# Patient Record
Sex: MALE | Race: BLACK OR AFRICAN AMERICAN | Employment: UNEMPLOYED | URBAN - METROPOLITAN AREA
[De-identification: names, ages, dates, MRNs, and addresses within clinical notes are randomized per-mention and may not be internally consistent; named-entity substitution may affect disease eponyms.]

---

## 2022-04-04 ENCOUNTER — HOSPITAL ENCOUNTER (OUTPATIENT)
Dept: PREADMISSION TESTING | Age: 57
Discharge: HOME | End: 2022-04-04
Payer: MEDICARE

## 2022-04-04 ENCOUNTER — TRANSCRIBE ORDER (OUTPATIENT)
Dept: REGISTRATION | Age: 57
End: 2022-04-04

## 2022-04-04 DIAGNOSIS — Z01.812 BLOOD TESTS PRIOR TO TREATMENT OR PROCEDURE: ICD-10-CM

## 2022-04-04 DIAGNOSIS — M17.12 DEGENERATIVE ARTHRITIS OF LEFT KNEE: Primary | ICD-10-CM

## 2022-04-04 DIAGNOSIS — M17.12 DEGENERATIVE ARTHRITIS OF LEFT KNEE: ICD-10-CM

## 2022-04-04 LAB
ALBUMIN SERPL-MCNC: 4.4 G/DL (ref 3.4–5)
ALBUMIN/GLOB SERPL: 1.3 {RATIO} (ref 0.8–1.7)
ALP SERPL-CCNC: 80 U/L (ref 45–117)
ALT SERPL-CCNC: 27 U/L (ref 16–61)
ANION GAP SERPL CALC-SCNC: 2 MMOL/L (ref 3–18)
APPEARANCE UR: CLEAR
APTT PPP: 30.1 SEC (ref 23–36.4)
AST SERPL-CCNC: 18 U/L (ref 10–38)
ATRIAL RATE: 77 BPM
BASOPHILS # BLD: 0.1 K/UL (ref 0–0.1)
BASOPHILS NFR BLD: 1 % (ref 0–2)
BILIRUB SERPL-MCNC: 0.7 MG/DL (ref 0.2–1)
BILIRUB UR QL: NEGATIVE
BUN SERPL-MCNC: 14 MG/DL (ref 7–18)
BUN/CREAT SERPL: 10 (ref 12–20)
CALCIUM SERPL-MCNC: 9.6 MG/DL (ref 8.5–10.1)
CALCULATED P AXIS, ECG09: 56 DEGREES
CALCULATED R AXIS, ECG10: 4 DEGREES
CALCULATED T AXIS, ECG11: 0 DEGREES
CHLORIDE SERPL-SCNC: 108 MMOL/L (ref 100–111)
CO2 SERPL-SCNC: 31 MMOL/L (ref 21–32)
COLOR UR: YELLOW
CREAT SERPL-MCNC: 1.35 MG/DL (ref 0.6–1.3)
DIAGNOSIS, 93000: NORMAL
DIFFERENTIAL METHOD BLD: ABNORMAL
EOSINOPHIL # BLD: 0.5 K/UL (ref 0–0.4)
EOSINOPHIL NFR BLD: 6 % (ref 0–5)
ERYTHROCYTE [DISTWIDTH] IN BLOOD BY AUTOMATED COUNT: 15 % (ref 11.6–14.5)
GLOBULIN SER CALC-MCNC: 3.4 G/DL (ref 2–4)
GLUCOSE SERPL-MCNC: 79 MG/DL (ref 74–99)
GLUCOSE UR STRIP.AUTO-MCNC: NEGATIVE MG/DL
HBA1C MFR BLD: 6.2 % (ref 4.2–5.6)
HCT VFR BLD AUTO: 45.9 % (ref 36–48)
HGB BLD-MCNC: 14.6 G/DL (ref 13–16)
HGB UR QL STRIP: NEGATIVE
IMM GRANULOCYTES # BLD AUTO: 0 K/UL (ref 0–0.04)
IMM GRANULOCYTES NFR BLD AUTO: 0 % (ref 0–0.5)
INR PPP: 1 (ref 0.8–1.2)
KETONES UR QL STRIP.AUTO: NEGATIVE MG/DL
LEUKOCYTE ESTERASE UR QL STRIP.AUTO: NEGATIVE
LYMPHOCYTES # BLD: 3.6 K/UL (ref 0.9–3.6)
LYMPHOCYTES NFR BLD: 46 % (ref 21–52)
MCH RBC QN AUTO: 25.7 PG (ref 24–34)
MCHC RBC AUTO-ENTMCNC: 31.8 G/DL (ref 31–37)
MCV RBC AUTO: 80.7 FL (ref 78–100)
MONOCYTES # BLD: 0.7 K/UL (ref 0.05–1.2)
MONOCYTES NFR BLD: 9 % (ref 3–10)
NEUTS SEG # BLD: 3 K/UL (ref 1.8–8)
NEUTS SEG NFR BLD: 37 % (ref 40–73)
NITRITE UR QL STRIP.AUTO: NEGATIVE
NRBC # BLD: 0 K/UL (ref 0–0.01)
NRBC BLD-RTO: 0 PER 100 WBC
P-R INTERVAL, ECG05: 186 MS
PH UR STRIP: 5 [PH] (ref 5–8)
PLATELET # BLD AUTO: 252 K/UL (ref 135–420)
PMV BLD AUTO: 10.1 FL (ref 9.2–11.8)
POTASSIUM SERPL-SCNC: 4.3 MMOL/L (ref 3.5–5.5)
PROT SERPL-MCNC: 7.8 G/DL (ref 6.4–8.2)
PROT UR STRIP-MCNC: NEGATIVE MG/DL
PROTHROMBIN TIME: 13 SEC (ref 11.5–15.2)
Q-T INTERVAL, ECG07: 384 MS
QRS DURATION, ECG06: 78 MS
QTC CALCULATION (BEZET), ECG08: 434 MS
RBC # BLD AUTO: 5.69 M/UL (ref 4.35–5.65)
SODIUM SERPL-SCNC: 141 MMOL/L (ref 136–145)
SP GR UR REFRACTOMETRY: 1.01 (ref 1–1.03)
UROBILINOGEN UR QL STRIP.AUTO: 0.2 EU/DL (ref 0.2–1)
VENTRICULAR RATE, ECG03: 77 BPM
WBC # BLD AUTO: 7.9 K/UL (ref 4.6–13.2)

## 2022-04-04 PROCEDURE — 85025 COMPLETE CBC W/AUTO DIFF WBC: CPT

## 2022-04-04 PROCEDURE — 83036 HEMOGLOBIN GLYCOSYLATED A1C: CPT

## 2022-04-04 PROCEDURE — 87086 URINE CULTURE/COLONY COUNT: CPT

## 2022-04-04 PROCEDURE — 85610 PROTHROMBIN TIME: CPT

## 2022-04-04 PROCEDURE — 36415 COLL VENOUS BLD VENIPUNCTURE: CPT

## 2022-04-04 PROCEDURE — 81003 URINALYSIS AUTO W/O SCOPE: CPT

## 2022-04-04 PROCEDURE — 93005 ELECTROCARDIOGRAM TRACING: CPT

## 2022-04-04 PROCEDURE — 85730 THROMBOPLASTIN TIME PARTIAL: CPT

## 2022-04-04 PROCEDURE — 80053 COMPREHEN METABOLIC PANEL: CPT

## 2022-04-06 ENCOUNTER — HOSPITAL ENCOUNTER (OUTPATIENT)
Dept: PREADMISSION TESTING | Age: 57
Discharge: HOME | End: 2022-04-06

## 2022-04-06 VITALS — BODY MASS INDEX: 40.02 KG/M2 | WEIGHT: 255 LBS | HEIGHT: 67 IN

## 2022-04-06 LAB
BACTERIA SPEC CULT: NORMAL
SERVICE CMNT-IMP: NORMAL

## 2022-04-06 RX ORDER — BICTEGRAVIR SODIUM, EMTRICITABINE, AND TENOFOVIR ALAFENAMIDE FUMARATE 50; 200; 25 MG/1; MG/1; MG/1
1 TABLET ORAL DAILY
COMMUNITY

## 2022-04-06 RX ORDER — INSULIN ASPART 100 [IU]/ML
30 INJECTION, SUSPENSION SUBCUTANEOUS 3 TIMES DAILY
COMMUNITY

## 2022-04-06 RX ORDER — ATORVASTATIN CALCIUM 10 MG/1
10 TABLET, FILM COATED ORAL DAILY
COMMUNITY

## 2022-04-06 RX ORDER — SODIUM CHLORIDE, SODIUM LACTATE, POTASSIUM CHLORIDE, CALCIUM CHLORIDE 600; 310; 30; 20 MG/100ML; MG/100ML; MG/100ML; MG/100ML
125 INJECTION, SOLUTION INTRAVENOUS CONTINUOUS
Status: CANCELLED | OUTPATIENT
Start: 2022-04-20

## 2022-04-06 RX ORDER — DARUNAVIR ETHANOLATE AND COBICISTAT 800; 150 MG/1; MG/1
1 TABLET, FILM COATED ORAL DAILY
COMMUNITY

## 2022-04-06 RX ORDER — DIAZEPAM 10 MG/1
10 TABLET ORAL
COMMUNITY

## 2022-04-06 RX ORDER — AMLODIPINE BESYLATE 5 MG/1
5 TABLET ORAL DAILY
COMMUNITY

## 2022-04-06 RX ORDER — ASENAPINE 5.7 MG/D
1 FILM, EXTENDED RELEASE TRANSDERMAL DAILY
COMMUNITY

## 2022-04-06 RX ORDER — CEFAZOLIN SODIUM/WATER 2 G/20 ML
2 SYRINGE (ML) INTRAVENOUS ONCE
Status: CANCELLED | OUTPATIENT
Start: 2022-04-20 | End: 2022-04-20

## 2022-04-06 NOTE — PERIOP NOTES
PAT - SURGICAL PRE-ADMISSION INSTRUCTIONS    NAME:  Mina Melgar                                                          TODAY'S DATE:  4/6/2022    SURGERY DATE:  4/20/2022                                  SURGERY ARRIVAL TIME:   tbd    1. Do NOT eat or drink anything, including candy or gum, after MIDNIGHT on 4-20-22, unless you have specific instructions from your Surgeon or Anesthesia Provider to do so. 2. No smoking 24 hours before surgery. 3. No alcohol 24 hours prior to the day of surgery. 4. No recreational drugs for one week prior to the day of surgery. 5. Leave all valuables, including money/purse, at home. 6. Remove all jewelry, nail polish, makeup (including mascara); no lotions, powders, deodorant, or perfume/cologne/after shave. 7. Glasses/Contact lenses and Dentures may be worn to the hospital.  They will be removed prior to surgery. 8. Call your doctor if symptoms of a cold or illness develop within 24 ours prior to surgery. 9. AN ADULT MUST DRIVE YOU HOME AFTER OUTPATIENT SURGERY. 10. If you are having an OUTPATIENT procedure, please make arrangements for a responsible adult to be with you for 24 hours after your surgery. 11. If you are admitted to the hospital, you will be assigned to a bed after surgery is complete. Normally a family member will not be able to see you until you are in your assigned bed. 12. Visitation Restrictions Explained. Pt has an advance directive not on file. Pt does not have a DNR order. Pt does not accept blood products. Special Instructions:  Covid Test Pt is vaccinated. Pt will bring vaccination record in [facility] [place] requirements discussed  Take these medications the morning of surgery with a sip of water:  amlodipine, atorvastatin, acetaminophen,   Do not take insulin in the morning on surgery day. Patient Prep:    use CHG solution, Instructions provided.      These surgical instructions were reviewed with patient during the PAT phone call. Pt verbalized understanding of instructions provided. Preoperative Nutrition Screen (VALARIE)   Patient's Age: 61 y.o. Patient's BMI: Estimated body mass index is 39.94 kg/m² as calculated from the following:    Height as of this encounter: 5' 7\" (1.702 m). Weight as of this encounter: 115.7 kg (255 lb). If the answer to any of the following is Yes, then recommend prescribe Oral Nutrition Supplements (ONS) for at least 7 days prior to surgery and/or order referral to dietitian for further assessment and nutrition therapy. 1. Does the patient have a documented serum albumin less than 3.0 within the last 90 days? No = 0          2. Is patient's BMI less than 18.5 (or less than 20 if age over 72)? No = 0        3. Has the patient had an unplanned weight loss of 10% of body weight or more in the last 6 months? No = 0   4. Has the patient been eating less than 50% of their normal diet in the preceding week?  No = 0   VALARIE Score (number of Yes responses), 0-4 0

## 2022-04-07 LAB
BACTERIA SPEC CULT: NORMAL
BACTERIA SPEC CULT: NORMAL
SERVICE CMNT-IMP: NORMAL

## 2022-04-15 NOTE — NURSE NAVIGATOR
Phil Danielle watched the pre op seminar online and received a preoperative education booklet in anticipation of surgery    Nurse Navigator

## 2022-04-20 ENCOUNTER — ANESTHESIA EVENT (OUTPATIENT)
Dept: SURGERY | Age: 57
DRG: 470 | End: 2022-04-20
Payer: MEDICARE

## 2022-04-20 ENCOUNTER — HOSPITAL ENCOUNTER (INPATIENT)
Age: 57
LOS: 1 days | Discharge: HOME HEALTH | DRG: 470 | End: 2022-04-21
Attending: ORTHOPAEDIC SURGERY | Admitting: ORTHOPAEDIC SURGERY
Payer: MEDICARE

## 2022-04-20 ENCOUNTER — ANESTHESIA (OUTPATIENT)
Dept: SURGERY | Age: 57
DRG: 470 | End: 2022-04-20
Payer: MEDICARE

## 2022-04-20 ENCOUNTER — APPOINTMENT (OUTPATIENT)
Dept: GENERAL RADIOLOGY | Age: 57
DRG: 470 | End: 2022-04-20
Attending: PHYSICIAN ASSISTANT
Payer: MEDICARE

## 2022-04-20 DIAGNOSIS — Z96.652 TOTAL KNEE REPLACEMENT STATUS, LEFT: Primary | ICD-10-CM

## 2022-04-20 LAB
GLUCOSE BLD STRIP.AUTO-MCNC: 108 MG/DL (ref 70–110)
GLUCOSE BLD STRIP.AUTO-MCNC: 139 MG/DL (ref 70–110)

## 2022-04-20 PROCEDURE — 2709999900 HC NON-CHARGEABLE SUPPLY: Performed by: ORTHOPAEDIC SURGERY

## 2022-04-20 PROCEDURE — C1713 ANCHOR/SCREW BN/BN,TIS/BN: HCPCS | Performed by: ORTHOPAEDIC SURGERY

## 2022-04-20 PROCEDURE — 0SRD069 REPLACEMENT OF LEFT KNEE JOINT WITH OXIDIZED ZIRCONIUM ON POLYETHYLENE SYNTHETIC SUBSTITUTE, CEMENTED, OPEN APPROACH: ICD-10-PCS | Performed by: ORTHOPAEDIC SURGERY

## 2022-04-20 PROCEDURE — 74011000250 HC RX REV CODE- 250: Performed by: NURSE ANESTHETIST, CERTIFIED REGISTERED

## 2022-04-20 PROCEDURE — C1776 JOINT DEVICE (IMPLANTABLE): HCPCS | Performed by: ORTHOPAEDIC SURGERY

## 2022-04-20 PROCEDURE — 77030013708 HC HNDPC SUC IRR PULS STRY –B: Performed by: ORTHOPAEDIC SURGERY

## 2022-04-20 PROCEDURE — 77030011628: Performed by: ORTHOPAEDIC SURGERY

## 2022-04-20 PROCEDURE — 77030040815: Performed by: ORTHOPAEDIC SURGERY

## 2022-04-20 PROCEDURE — 77030002933 HC SUT MCRYL J&J -A: Performed by: ORTHOPAEDIC SURGERY

## 2022-04-20 PROCEDURE — 77030040361 HC SLV COMPR DVT MDII -B: Performed by: ORTHOPAEDIC SURGERY

## 2022-04-20 PROCEDURE — 77030038010: Performed by: ORTHOPAEDIC SURGERY

## 2022-04-20 PROCEDURE — C9290 INJ, BUPIVACAINE LIPOSOME: HCPCS | Performed by: ORTHOPAEDIC SURGERY

## 2022-04-20 PROCEDURE — 76010000131 HC OR TIME 2 TO 2.5 HR: Performed by: ORTHOPAEDIC SURGERY

## 2022-04-20 PROCEDURE — 77030000032 HC CUF TRNQT ZIMM -B: Performed by: ORTHOPAEDIC SURGERY

## 2022-04-20 PROCEDURE — 77030033067 HC SUT PDO STRATFX SPIR J&J -B: Performed by: ORTHOPAEDIC SURGERY

## 2022-04-20 PROCEDURE — 64447 NJX AA&/STRD FEMORAL NRV IMG: CPT | Performed by: ORTHOPAEDIC SURGERY

## 2022-04-20 PROCEDURE — 77030014144 HC TY SPN ANES BBMI -B: Performed by: STUDENT IN AN ORGANIZED HEALTH CARE EDUCATION/TRAINING PROGRAM

## 2022-04-20 PROCEDURE — 74011250636 HC RX REV CODE- 250/636: Performed by: ORTHOPAEDIC SURGERY

## 2022-04-20 PROCEDURE — 76060000035 HC ANESTHESIA 2 TO 2.5 HR: Performed by: ORTHOPAEDIC SURGERY

## 2022-04-20 PROCEDURE — 74011000250 HC RX REV CODE- 250: Performed by: ORTHOPAEDIC SURGERY

## 2022-04-20 PROCEDURE — 74011250636 HC RX REV CODE- 250/636: Performed by: PHYSICIAN ASSISTANT

## 2022-04-20 PROCEDURE — 74011250637 HC RX REV CODE- 250/637: Performed by: PHYSICIAN ASSISTANT

## 2022-04-20 PROCEDURE — 74011250636 HC RX REV CODE- 250/636: Performed by: STUDENT IN AN ORGANIZED HEALTH CARE EDUCATION/TRAINING PROGRAM

## 2022-04-20 PROCEDURE — 77030018673: Performed by: ORTHOPAEDIC SURGERY

## 2022-04-20 PROCEDURE — 77030011265 HC ELECTRD BLD HEX COVD -A: Performed by: ORTHOPAEDIC SURGERY

## 2022-04-20 PROCEDURE — 77030034479 HC ADH SKN CLSR PRINEO J&J -B: Performed by: ORTHOPAEDIC SURGERY

## 2022-04-20 PROCEDURE — 65270000029 HC RM PRIVATE

## 2022-04-20 PROCEDURE — 77030020782 HC GWN BAIR PAWS FLX 3M -B: Performed by: ORTHOPAEDIC SURGERY

## 2022-04-20 PROCEDURE — 97530 THERAPEUTIC ACTIVITIES: CPT

## 2022-04-20 PROCEDURE — 77030027138 HC INCENT SPIROMETER -A: Performed by: ORTHOPAEDIC SURGERY

## 2022-04-20 PROCEDURE — 74011250636 HC RX REV CODE- 250/636: Performed by: NURSE ANESTHETIST, CERTIFIED REGISTERED

## 2022-04-20 PROCEDURE — 77030031139 HC SUT VCRL2 J&J -A: Performed by: ORTHOPAEDIC SURGERY

## 2022-04-20 PROCEDURE — 74011000258 HC RX REV CODE- 258: Performed by: ORTHOPAEDIC SURGERY

## 2022-04-20 PROCEDURE — 82962 GLUCOSE BLOOD TEST: CPT

## 2022-04-20 PROCEDURE — 76210000006 HC OR PH I REC 0.5 TO 1 HR: Performed by: ORTHOPAEDIC SURGERY

## 2022-04-20 PROCEDURE — 97162 PT EVAL MOD COMPLEX 30 MIN: CPT

## 2022-04-20 PROCEDURE — 73560 X-RAY EXAM OF KNEE 1 OR 2: CPT

## 2022-04-20 PROCEDURE — 77010033678 HC OXYGEN DAILY

## 2022-04-20 PROCEDURE — 97166 OT EVAL MOD COMPLEX 45 MIN: CPT

## 2022-04-20 PROCEDURE — 74011000250 HC RX REV CODE- 250: Performed by: STUDENT IN AN ORGANIZED HEALTH CARE EDUCATION/TRAINING PROGRAM

## 2022-04-20 PROCEDURE — 76942 ECHO GUIDE FOR BIOPSY: CPT | Performed by: ORTHOPAEDIC SURGERY

## 2022-04-20 PROCEDURE — 74011000250 HC RX REV CODE- 250: Performed by: PHYSICIAN ASSISTANT

## 2022-04-20 DEVICE — JOURNEY II BCS XLPE ARTICULAR                                    INSERT SIZE 5-6 LEFT 9MM
Type: IMPLANTABLE DEVICE | Site: KNEE | Status: FUNCTIONAL
Brand: JOURNEY

## 2022-04-20 DEVICE — KNEE K1 TOT HEMI STD CEM IMPL CAPPED K1 SN: Type: IMPLANTABLE DEVICE | Site: KNEE | Status: FUNCTIONAL

## 2022-04-20 DEVICE — IMPLANTABLE DEVICE: Type: IMPLANTABLE DEVICE | Site: KNEE | Status: FUNCTIONAL

## 2022-04-20 DEVICE — CEMENT BNE 40GM FULL DOSE PMMA W/O ANTIBIO HI VISC N RADPQ: Type: IMPLANTABLE DEVICE | Site: KNEE | Status: FUNCTIONAL

## 2022-04-20 DEVICE — GII XLPE OVAL RESURFACING PATELLA 35MM
Type: IMPLANTABLE DEVICE | Site: KNEE | Status: FUNCTIONAL
Brand: GENESIS II

## 2022-04-20 DEVICE — JOURNEY TIBIAL BASEPLATE NONPOROUS                                    LEFT SIZE 5
Type: IMPLANTABLE DEVICE | Site: KNEE | Status: FUNCTIONAL
Brand: JOURNEY

## 2022-04-20 DEVICE — JOURNEY II BCS FEMORAL OXINIUM                                    LEFT SIZE 5
Type: IMPLANTABLE DEVICE | Site: KNEE | Status: FUNCTIONAL
Brand: JOURNEY

## 2022-04-20 RX ORDER — SODIUM CHLORIDE, SODIUM LACTATE, POTASSIUM CHLORIDE, CALCIUM CHLORIDE 600; 310; 30; 20 MG/100ML; MG/100ML; MG/100ML; MG/100ML
100 INJECTION, SOLUTION INTRAVENOUS CONTINUOUS
Status: DISCONTINUED | OUTPATIENT
Start: 2022-04-20 | End: 2022-04-21 | Stop reason: HOSPADM

## 2022-04-20 RX ORDER — DEXAMETHASONE SODIUM PHOSPHATE 10 MG/ML
INJECTION INTRAMUSCULAR; INTRAVENOUS
Status: COMPLETED | OUTPATIENT
Start: 2022-04-20 | End: 2022-04-20

## 2022-04-20 RX ORDER — MIDAZOLAM HYDROCHLORIDE 1 MG/ML
INJECTION, SOLUTION INTRAMUSCULAR; INTRAVENOUS
Status: COMPLETED | OUTPATIENT
Start: 2022-04-20 | End: 2022-04-20

## 2022-04-20 RX ORDER — DIAZEPAM 5 MG/1
10 TABLET ORAL
Status: DISCONTINUED | OUTPATIENT
Start: 2022-04-20 | End: 2022-04-21 | Stop reason: HOSPADM

## 2022-04-20 RX ORDER — FENTANYL CITRATE 50 UG/ML
INJECTION, SOLUTION INTRAMUSCULAR; INTRAVENOUS
Status: COMPLETED
Start: 2022-04-20 | End: 2022-04-20

## 2022-04-20 RX ORDER — SODIUM CHLORIDE 0.9 % (FLUSH) 0.9 %
5-40 SYRINGE (ML) INJECTION EVERY 8 HOURS
Status: DISCONTINUED | OUTPATIENT
Start: 2022-04-20 | End: 2022-04-20 | Stop reason: HOSPADM

## 2022-04-20 RX ORDER — DIPHENHYDRAMINE HCL 25 MG
25 CAPSULE ORAL
Status: DISCONTINUED | OUTPATIENT
Start: 2022-04-20 | End: 2022-04-21 | Stop reason: HOSPADM

## 2022-04-20 RX ORDER — FENTANYL CITRATE 50 UG/ML
50 INJECTION, SOLUTION INTRAMUSCULAR; INTRAVENOUS
Status: DISCONTINUED | OUTPATIENT
Start: 2022-04-20 | End: 2022-04-20 | Stop reason: HOSPADM

## 2022-04-20 RX ORDER — INSULIN LISPRO 100 [IU]/ML
INJECTION, SOLUTION INTRAVENOUS; SUBCUTANEOUS
Status: DISCONTINUED | OUTPATIENT
Start: 2022-04-20 | End: 2022-04-21 | Stop reason: HOSPADM

## 2022-04-20 RX ORDER — DEXMEDETOMIDINE HYDROCHLORIDE 100 UG/ML
INJECTION, SOLUTION INTRAVENOUS
Status: COMPLETED | OUTPATIENT
Start: 2022-04-20 | End: 2022-04-20

## 2022-04-20 RX ORDER — NALBUPHINE HYDROCHLORIDE 10 MG/ML
5 INJECTION, SOLUTION INTRAMUSCULAR; INTRAVENOUS; SUBCUTANEOUS
Status: DISCONTINUED | OUTPATIENT
Start: 2022-04-20 | End: 2022-04-20 | Stop reason: HOSPADM

## 2022-04-20 RX ORDER — LIDOCAINE HYDROCHLORIDE 20 MG/ML
INJECTION, SOLUTION EPIDURAL; INFILTRATION; INTRACAUDAL; PERINEURAL AS NEEDED
Status: DISCONTINUED | OUTPATIENT
Start: 2022-04-20 | End: 2022-04-20 | Stop reason: HOSPADM

## 2022-04-20 RX ORDER — MIDAZOLAM HYDROCHLORIDE 1 MG/ML
INJECTION, SOLUTION INTRAMUSCULAR; INTRAVENOUS
Status: COMPLETED
Start: 2022-04-20 | End: 2022-04-20

## 2022-04-20 RX ORDER — OXYCODONE HYDROCHLORIDE 5 MG/1
5 TABLET ORAL
Status: DISCONTINUED | OUTPATIENT
Start: 2022-04-20 | End: 2022-04-21 | Stop reason: HOSPADM

## 2022-04-20 RX ORDER — HYDROMORPHONE HYDROCHLORIDE 1 MG/ML
0.5 INJECTION, SOLUTION INTRAMUSCULAR; INTRAVENOUS; SUBCUTANEOUS AS NEEDED
Status: DISCONTINUED | OUTPATIENT
Start: 2022-04-20 | End: 2022-04-20 | Stop reason: HOSPADM

## 2022-04-20 RX ORDER — BUPIVACAINE HYDROCHLORIDE 7.5 MG/ML
INJECTION, SOLUTION INTRASPINAL
Status: COMPLETED | OUTPATIENT
Start: 2022-04-20 | End: 2022-04-20

## 2022-04-20 RX ORDER — HYDROMORPHONE HYDROCHLORIDE 1 MG/ML
1 INJECTION, SOLUTION INTRAMUSCULAR; INTRAVENOUS; SUBCUTANEOUS
Status: DISCONTINUED | OUTPATIENT
Start: 2022-04-20 | End: 2022-04-21 | Stop reason: HOSPADM

## 2022-04-20 RX ORDER — ONDANSETRON 2 MG/ML
4 INJECTION INTRAMUSCULAR; INTRAVENOUS ONCE
Status: DISCONTINUED | OUTPATIENT
Start: 2022-04-20 | End: 2022-04-20 | Stop reason: HOSPADM

## 2022-04-20 RX ORDER — DEXAMETHASONE SODIUM PHOSPHATE 10 MG/ML
INJECTION INTRAMUSCULAR; INTRAVENOUS
Status: COMPLETED
Start: 2022-04-20 | End: 2022-04-20

## 2022-04-20 RX ORDER — DEXTROSE MONOHYDRATE 100 MG/ML
0-250 INJECTION, SOLUTION INTRAVENOUS AS NEEDED
Status: DISCONTINUED | OUTPATIENT
Start: 2022-04-20 | End: 2022-04-21 | Stop reason: HOSPADM

## 2022-04-20 RX ORDER — SODIUM CHLORIDE 0.9 % (FLUSH) 0.9 %
5-40 SYRINGE (ML) INJECTION EVERY 8 HOURS
Status: CANCELLED | OUTPATIENT
Start: 2022-04-20

## 2022-04-20 RX ORDER — UREA 10 %
1 LOTION (ML) TOPICAL
Status: DISCONTINUED | OUTPATIENT
Start: 2022-04-21 | End: 2022-04-21 | Stop reason: HOSPADM

## 2022-04-20 RX ORDER — IBUPROFEN 200 MG
4 TABLET ORAL AS NEEDED
Status: DISCONTINUED | OUTPATIENT
Start: 2022-04-20 | End: 2022-04-21 | Stop reason: HOSPADM

## 2022-04-20 RX ORDER — SODIUM CHLORIDE, SODIUM LACTATE, POTASSIUM CHLORIDE, CALCIUM CHLORIDE 600; 310; 30; 20 MG/100ML; MG/100ML; MG/100ML; MG/100ML
125 INJECTION, SOLUTION INTRAVENOUS CONTINUOUS
Status: DISCONTINUED | OUTPATIENT
Start: 2022-04-20 | End: 2022-04-20

## 2022-04-20 RX ORDER — SODIUM CHLORIDE 0.9 % (FLUSH) 0.9 %
5-40 SYRINGE (ML) INJECTION AS NEEDED
Status: DISCONTINUED | OUTPATIENT
Start: 2022-04-20 | End: 2022-04-20 | Stop reason: HOSPADM

## 2022-04-20 RX ORDER — ACETAMINOPHEN 500 MG
1000 TABLET ORAL EVERY 8 HOURS
Status: DISCONTINUED | OUTPATIENT
Start: 2022-04-20 | End: 2022-04-21 | Stop reason: HOSPADM

## 2022-04-20 RX ORDER — NALOXONE HYDROCHLORIDE 0.4 MG/ML
0.4 INJECTION, SOLUTION INTRAMUSCULAR; INTRAVENOUS; SUBCUTANEOUS AS NEEDED
Status: DISCONTINUED | OUTPATIENT
Start: 2022-04-20 | End: 2022-04-21 | Stop reason: HOSPADM

## 2022-04-20 RX ORDER — SODIUM CHLORIDE 0.9 % (FLUSH) 0.9 %
5-40 SYRINGE (ML) INJECTION EVERY 8 HOURS
Status: DISCONTINUED | OUTPATIENT
Start: 2022-04-20 | End: 2022-04-21 | Stop reason: HOSPADM

## 2022-04-20 RX ORDER — FENTANYL CITRATE 50 UG/ML
INJECTION, SOLUTION INTRAMUSCULAR; INTRAVENOUS
Status: COMPLETED | OUTPATIENT
Start: 2022-04-20 | End: 2022-04-20

## 2022-04-20 RX ORDER — KETOROLAC TROMETHAMINE 30 MG/ML
15 INJECTION, SOLUTION INTRAMUSCULAR; INTRAVENOUS EVERY 6 HOURS
Status: DISCONTINUED | OUTPATIENT
Start: 2022-04-20 | End: 2022-04-21 | Stop reason: HOSPADM

## 2022-04-20 RX ORDER — NALOXONE HYDROCHLORIDE 0.4 MG/ML
0.04 INJECTION, SOLUTION INTRAMUSCULAR; INTRAVENOUS; SUBCUTANEOUS
Status: DISCONTINUED | OUTPATIENT
Start: 2022-04-20 | End: 2022-04-20 | Stop reason: HOSPADM

## 2022-04-20 RX ORDER — PROPOFOL 10 MG/ML
INJECTION, EMULSION INTRAVENOUS
Status: DISCONTINUED | OUTPATIENT
Start: 2022-04-20 | End: 2022-04-20 | Stop reason: HOSPADM

## 2022-04-20 RX ORDER — AMOXICILLIN 250 MG
1 CAPSULE ORAL 2 TIMES DAILY
Status: DISCONTINUED | OUTPATIENT
Start: 2022-04-20 | End: 2022-04-21 | Stop reason: HOSPADM

## 2022-04-20 RX ORDER — ONDANSETRON 2 MG/ML
4 INJECTION INTRAMUSCULAR; INTRAVENOUS
Status: DISCONTINUED | OUTPATIENT
Start: 2022-04-20 | End: 2022-04-21 | Stop reason: HOSPADM

## 2022-04-20 RX ORDER — OXYCODONE HYDROCHLORIDE 5 MG/1
10 TABLET ORAL
Status: DISCONTINUED | OUTPATIENT
Start: 2022-04-20 | End: 2022-04-21 | Stop reason: HOSPADM

## 2022-04-20 RX ORDER — TRANEXAMIC ACID 10 MG/ML
1 INJECTION, SOLUTION INTRAVENOUS
Status: CANCELLED | OUTPATIENT
Start: 2022-04-20 | End: 2022-04-20

## 2022-04-20 RX ORDER — ASPIRIN 81 MG/1
81 TABLET ORAL 2 TIMES DAILY
Status: DISCONTINUED | OUTPATIENT
Start: 2022-04-20 | End: 2022-04-21 | Stop reason: HOSPADM

## 2022-04-20 RX ORDER — ROPIVACAINE HYDROCHLORIDE 5 MG/ML
INJECTION, SOLUTION EPIDURAL; INFILTRATION; PERINEURAL
Status: COMPLETED | OUTPATIENT
Start: 2022-04-20 | End: 2022-04-20

## 2022-04-20 RX ORDER — BUPIVACAINE HYDROCHLORIDE 2.5 MG/ML
INJECTION, SOLUTION EPIDURAL; INFILTRATION; INTRACAUDAL AS NEEDED
Status: DISCONTINUED | OUTPATIENT
Start: 2022-04-20 | End: 2022-04-20 | Stop reason: HOSPADM

## 2022-04-20 RX ORDER — ALBUTEROL SULFATE 0.83 MG/ML
2.5 SOLUTION RESPIRATORY (INHALATION)
Status: DISCONTINUED | OUTPATIENT
Start: 2022-04-20 | End: 2022-04-20 | Stop reason: HOSPADM

## 2022-04-20 RX ORDER — EPHEDRINE SULFATE/0.9% NACL/PF 50 MG/5 ML
SYRINGE (ML) INTRAVENOUS AS NEEDED
Status: DISCONTINUED | OUTPATIENT
Start: 2022-04-20 | End: 2022-04-20 | Stop reason: HOSPADM

## 2022-04-20 RX ORDER — POVIDONE-IODINE 5 %
SOLUTION, NON-ORAL OPHTHALMIC (EYE) AS NEEDED
Status: DISCONTINUED | OUTPATIENT
Start: 2022-04-20 | End: 2022-04-20 | Stop reason: HOSPADM

## 2022-04-20 RX ORDER — CEFAZOLIN SODIUM/WATER 2 G/20 ML
2 SYRINGE (ML) INTRAVENOUS EVERY 8 HOURS
Status: COMPLETED | OUTPATIENT
Start: 2022-04-20 | End: 2022-04-21

## 2022-04-20 RX ORDER — SODIUM CHLORIDE, SODIUM LACTATE, POTASSIUM CHLORIDE, CALCIUM CHLORIDE 600; 310; 30; 20 MG/100ML; MG/100ML; MG/100ML; MG/100ML
50 INJECTION, SOLUTION INTRAVENOUS CONTINUOUS
Status: DISCONTINUED | OUTPATIENT
Start: 2022-04-20 | End: 2022-04-20 | Stop reason: HOSPADM

## 2022-04-20 RX ORDER — SODIUM CHLORIDE 0.9 % (FLUSH) 0.9 %
5-40 SYRINGE (ML) INJECTION AS NEEDED
Status: CANCELLED | OUTPATIENT
Start: 2022-04-20

## 2022-04-20 RX ORDER — DIPHENHYDRAMINE HYDROCHLORIDE 50 MG/ML
12.5 INJECTION, SOLUTION INTRAMUSCULAR; INTRAVENOUS
Status: DISCONTINUED | OUTPATIENT
Start: 2022-04-20 | End: 2022-04-20 | Stop reason: HOSPADM

## 2022-04-20 RX ORDER — TRANEXAMIC ACID 10 MG/ML
INJECTION, SOLUTION INTRAVENOUS AS NEEDED
Status: DISCONTINUED | OUTPATIENT
Start: 2022-04-20 | End: 2022-04-20 | Stop reason: HOSPADM

## 2022-04-20 RX ORDER — AMLODIPINE BESYLATE 5 MG/1
5 TABLET ORAL DAILY
Status: DISCONTINUED | OUTPATIENT
Start: 2022-04-21 | End: 2022-04-21 | Stop reason: HOSPADM

## 2022-04-20 RX ORDER — CEFAZOLIN SODIUM/WATER 2 G/20 ML
2 SYRINGE (ML) INTRAVENOUS ONCE
Status: COMPLETED | OUTPATIENT
Start: 2022-04-20 | End: 2022-04-20

## 2022-04-20 RX ORDER — KETAMINE HYDROCHLORIDE 10 MG/ML
INJECTION, SOLUTION INTRAMUSCULAR; INTRAVENOUS AS NEEDED
Status: DISCONTINUED | OUTPATIENT
Start: 2022-04-20 | End: 2022-04-20 | Stop reason: HOSPADM

## 2022-04-20 RX ORDER — ATORVASTATIN CALCIUM 10 MG/1
10 TABLET, FILM COATED ORAL DAILY
Status: DISCONTINUED | OUTPATIENT
Start: 2022-04-21 | End: 2022-04-21 | Stop reason: HOSPADM

## 2022-04-20 RX ORDER — SODIUM CHLORIDE 0.9 % (FLUSH) 0.9 %
5-40 SYRINGE (ML) INJECTION AS NEEDED
Status: DISCONTINUED | OUTPATIENT
Start: 2022-04-20 | End: 2022-04-21 | Stop reason: HOSPADM

## 2022-04-20 RX ADMIN — ACETAMINOPHEN 1000 MG: 500 TABLET ORAL at 18:37

## 2022-04-20 RX ADMIN — ROPIVACAINE HYDROCHLORIDE 20 ML: 5 INJECTION, SOLUTION EPIDURAL; INFILTRATION; PERINEURAL at 12:16

## 2022-04-20 RX ADMIN — MIDAZOLAM 2 MG: 1 INJECTION INTRAMUSCULAR; INTRAVENOUS at 13:34

## 2022-04-20 RX ADMIN — Medication 2 G: at 13:48

## 2022-04-20 RX ADMIN — SODIUM CHLORIDE, SODIUM LACTATE, POTASSIUM CHLORIDE, AND CALCIUM CHLORIDE 100 ML/HR: 600; 310; 30; 20 INJECTION, SOLUTION INTRAVENOUS at 17:31

## 2022-04-20 RX ADMIN — TRANEXAMIC ACID 1 G: 10 INJECTION, SOLUTION INTRAVENOUS at 13:44

## 2022-04-20 RX ADMIN — HYDROMORPHONE HYDROCHLORIDE 0.5 MG: 1 INJECTION, SOLUTION INTRAMUSCULAR; INTRAVENOUS; SUBCUTANEOUS at 16:33

## 2022-04-20 RX ADMIN — Medication 1 TABLET: at 20:16

## 2022-04-20 RX ADMIN — HYDROMORPHONE HYDROCHLORIDE 1 MG: 1 INJECTION, SOLUTION INTRAMUSCULAR; INTRAVENOUS; SUBCUTANEOUS at 23:13

## 2022-04-20 RX ADMIN — OXYCODONE HYDROCHLORIDE 10 MG: 5 TABLET ORAL at 21:31

## 2022-04-20 RX ADMIN — KETOROLAC TROMETHAMINE 15 MG: 30 INJECTION, SOLUTION INTRAMUSCULAR at 17:30

## 2022-04-20 RX ADMIN — KETOROLAC TROMETHAMINE 15 MG: 30 INJECTION, SOLUTION INTRAMUSCULAR at 23:05

## 2022-04-20 RX ADMIN — FENTANYL CITRATE 50 MCG: 50 INJECTION, SOLUTION INTRAMUSCULAR; INTRAVENOUS at 16:15

## 2022-04-20 RX ADMIN — CEFAZOLIN 2 G: 10 INJECTION, POWDER, FOR SOLUTION INTRAVENOUS at 21:32

## 2022-04-20 RX ADMIN — HYDROMORPHONE HYDROCHLORIDE 0.5 MG: 1 INJECTION, SOLUTION INTRAMUSCULAR; INTRAVENOUS; SUBCUTANEOUS at 16:43

## 2022-04-20 RX ADMIN — DEXAMETHASONE SODIUM PHOSPHATE 4 MG: 10 INJECTION, SOLUTION INTRAMUSCULAR; INTRAVENOUS at 12:16

## 2022-04-20 RX ADMIN — Medication 5 MG: at 14:29

## 2022-04-20 RX ADMIN — KETAMINE HYDROCHLORIDE 10 MG: 10 INJECTION, SOLUTION INTRAMUSCULAR; INTRAVENOUS at 14:46

## 2022-04-20 RX ADMIN — OXYCODONE HYDROCHLORIDE 10 MG: 5 TABLET ORAL at 17:29

## 2022-04-20 RX ADMIN — MIDAZOLAM 2 MG: 1 INJECTION INTRAMUSCULAR; INTRAVENOUS at 12:16

## 2022-04-20 RX ADMIN — BUPIVACAINE HYDROCHLORIDE IN DEXTROSE 12 MG: 7.5 INJECTION, SOLUTION SUBARACHNOID at 13:45

## 2022-04-20 RX ADMIN — KETAMINE HYDROCHLORIDE 10 MG: 10 INJECTION, SOLUTION INTRAMUSCULAR; INTRAVENOUS at 14:31

## 2022-04-20 RX ADMIN — FENTANYL CITRATE 50 MCG: 50 INJECTION, SOLUTION INTRAMUSCULAR; INTRAVENOUS at 16:27

## 2022-04-20 RX ADMIN — SODIUM CHLORIDE, PRESERVATIVE FREE 10 ML: 5 INJECTION INTRAVENOUS at 20:17

## 2022-04-20 RX ADMIN — ASPIRIN 81 MG: 81 TABLET, COATED ORAL at 20:16

## 2022-04-20 RX ADMIN — KETAMINE HYDROCHLORIDE 20 MG: 10 INJECTION, SOLUTION INTRAMUSCULAR; INTRAVENOUS at 13:58

## 2022-04-20 RX ADMIN — TRANEXAMIC ACID 1 G: 10 INJECTION, SOLUTION INTRAVENOUS at 15:17

## 2022-04-20 RX ADMIN — SODIUM CHLORIDE, SODIUM LACTATE, POTASSIUM CHLORIDE, AND CALCIUM CHLORIDE 125 ML/HR: 600; 310; 30; 20 INJECTION, SOLUTION INTRAVENOUS at 11:13

## 2022-04-20 RX ADMIN — SODIUM CHLORIDE, PRESERVATIVE FREE 10 ML: 5 INJECTION INTRAVENOUS at 23:05

## 2022-04-20 RX ADMIN — PROPOFOL 100 MCG/KG/MIN: 10 INJECTION, EMULSION INTRAVENOUS at 13:49

## 2022-04-20 RX ADMIN — KETAMINE HYDROCHLORIDE 10 MG: 10 INJECTION, SOLUTION INTRAMUSCULAR; INTRAVENOUS at 14:16

## 2022-04-20 RX ADMIN — FENTANYL CITRATE 100 MCG: 50 INJECTION, SOLUTION INTRAMUSCULAR; INTRAVENOUS at 12:16

## 2022-04-20 RX ADMIN — DEXMEDETOMIDINE HYDROCHLORIDE 25 MCG: 100 INJECTION, SOLUTION INTRAVENOUS at 12:16

## 2022-04-20 RX ADMIN — LIDOCAINE HYDROCHLORIDE 100 MG: 20 INJECTION, SOLUTION INTRAVENOUS at 13:49

## 2022-04-20 NOTE — PROGRESS NOTES
31 [facility] at this time. Patient alert and oriented x4. Denies SOB, chest pain. Shows no signs of distress. Patient lungs clear diminished bilaterally. Cap refill < 3 sec to all extremities. Patient has 20 G IV to R [facility] CDI. Stated pain 8/10. Dressing to L knee is CDI. Plexis bilat and alex stockings applied to RLE. Incentive spirometer at bedside. Patient reaches 2000 on IS. Bowel sounds present. Skin intact. Patient call light and possessions within reach. Bed locked and in lowest position. Nurse will continue to monitor. [address] to [employer]. Just completed suicide screening and patient is showing to be high risk. Patient reports suicidal idealization over the last couple of weeks. States his psychiatrist stopped his meds d/t risk of interaction with pain meds after surgery. Patient states he has no intentions at this time for carrying out suicide, but that it could change in the future. Per [employer], no sitter needed at this time. Place psych consult. 14 Taryn Meza to Dr. Gabriel Santana and notified of consult. States he will be here tomorrow to see patient. 315 [facility] [place]. States Dr. Aline Becker is coming to speak with patient. Jolie 36 to Dr. Aline Becker. Stated patient denies current thoughts and no sitter is needed. Patient to f/u with primary psych at discharge and continue with consult tomorrow. 1925  Bedside and verbal shift change report given to Sushma ARAUJO RN by Morena Coffman RN. Report included SBAR, kardex, MAR, and recent results. Patient has not yet voided post op or ambulated. Pain controlled with prn elias.

## 2022-04-20 NOTE — PERIOP NOTES
TRANSFER - OUT REPORT:    Verbal report given to Rosario TORRES RN(name) on Miles Winter  being transferred to [address] [place] [address](unit) for routine post - op       Report consisted of patients Situation, Background, Assessment and   Recommendations(SBAR). Information from the following report(s) SBAR, Kardex, OR Summary, Procedure Summary, Intake/Output, MAR, Recent Results, Med Rec Status and Cardiac Rhythm NSR was reviewed with the receiving nurse. Lines:   Peripheral IV 04/20/22 Right Antecubital (Active)   Site Assessment Clean;Clean, dry, & intact 04/20/22 1620   Phlebitis Assessment 0 04/20/22 1620   Infiltration Assessment 0 04/20/22 1620   Dressing Status Clean, dry, & intact 04/20/22 1620   Dressing Type Tape;Transparent 04/20/22 1620   Hub Color/Line Status Infusing 04/20/22 1620        Opportunity for questions and clarification was provided.       Patient transported with:   O2 @ 2 liters  Registered Nurse

## 2022-04-20 NOTE — PROGRESS NOTES
Pt reports to me that he feels he is at his baseline with respect to his psychiatric condition. Denies active suicidal ideation. Reports having call with his psychiatrist yesterday who is active in his care (Dr. Piter Moulton). Plan for Psych consult followed by close outpatient Psych follow-up and ongoing assessment and monitoring.

## 2022-04-20 NOTE — ANESTHESIA PREPROCEDURE EVALUATION
Relevant Problems   No relevant active problems       Anesthetic History   No history of anesthetic complications     Pertinent negatives: No PONV       Review of Systems / Medical History  Patient summary reviewed, nursing notes reviewed and pertinent labs reviewed    Pulmonary  Within defined limits            Pertinent negatives: No COPD, asthma and sleep apnea     Neuro/Psych         Psychiatric history (Bipolar disorder)  Pertinent negatives: No seizures and CVA   Cardiovascular    Hypertension            Pertinent negatives: No past MI and CHF       GI/Hepatic/Renal             Pertinent negatives: No liver disease and renal disease   Endo/Other    Diabetes: well controlled, type 2, using insulin    Arthritis     Other Findings   Comments: HIV positive           Physical Exam    Airway  Mallampati: I  TM Distance: 4 - 6 cm  Neck ROM: normal range of motion   Mouth opening: Normal     Cardiovascular    Rhythm: regular  Rate: normal         Dental    Dentition: Poor dentition     Pulmonary  Breath sounds clear to auscultation               Abdominal  GI exam deferred       Other Findings            Anesthetic Plan    ASA: 3  Anesthesia type: spinal      Post-op pain plan if not by surgeon: peripheral nerve block single    Induction: Intravenous  Anesthetic plan and risks discussed with: Patient

## 2022-04-20 NOTE — ANESTHESIA PROCEDURE NOTES
Peripheral Block    Start time: 4/20/2022 12:12 PM  End time: 4/20/2022 12:16 PM  Performed by: Joe Kyle MD  Authorized by: Joe Kyle MD       Pre-procedure: Indications: at surgeon's request and post-op pain management    Preanesthetic Checklist: patient identified, risks and benefits discussed, site marked, timeout performed, anesthesia consent given and patient being monitored    Timeout Time: 12:12 EDT          Block Type:   Block Type: Adductor canal block  Laterality:  Left  Monitoring:  Standard ASA monitoring, responsive to questions, oxygen, continuous pulse ox, heart rate and frequent vital sign checks  Injection Technique:  Single shot  Procedures: ultrasound guided    Patient Position: supine  Prep: chlorhexidine    Location:  Mid thigh  Needle Type:  Stimuplex  Needle Gauge:  20 G  Needle Localization:  Ultrasound guidance  Medication Injected:  FentaNYL citrate (PF) injection sedation initial, 100 mcg  midazolam (VERSED) injection, 2 mg  ropivacaine (PF) (NAROPIN) 5 mg/mL (0.5 %) injection, 20 mL  dexamethasone (PF) (DECADRON) 10 mg/mL injection, 4 mg  dexmedeTOMidine (PRECEDEX) 100 mcg/mL iv solution, 25 mcg  Med Admin Time: 4/20/2022 12:16 PM    Assessment:  Number of attempts:  1  Injection Assessment:  Incremental injection every 5 mL, negative aspiration for CSF, no paresthesia, ultrasound image on chart, local visualized surrounding nerve on ultrasound, negative aspiration for blood and no intravascular symptoms  Patient tolerance:  Patient tolerated the procedure well with no immediate complications  Ultrasound guidance was used to view and verify medication disbursement and was also used for needle placement.

## 2022-04-20 NOTE — PERIOP NOTES
18 [facility] made aware that SBAR is ready for review. Patient assigned room #225.  Rosario will be the nurse

## 2022-04-20 NOTE — ANESTHESIA PROCEDURE NOTES
Spinal Block    Start time: 4/20/2022 1:41 PM  End time: 4/20/2022 1:45 PM  Performed by: Collins De Oliveira MD  Authorized by: Collins De Oliveira MD     Pre-procedure: Indications: primary anesthetic  Preanesthetic Checklist: patient identified, risks and benefits discussed, anesthesia consent, site marked, patient being monitored and timeout performed    Timeout Time: 13:41 EDT          Spinal Block:   Patient Position:  Seated  Prep Region:  Lumbar  Prep: chlorhexidine      Location:  L3-4  Technique:  Single shot    Local Dose (mL):  1.6    Needle:   Needle Type:   Alva  Needle Gauge:  25 G  Attempts:  1      Events: CSF confirmed, no blood with aspiration and no paresthesia        Assessment:  Insertion:  Uncomplicated  Patient tolerance:  Patient tolerated the procedure well with no immediate complications

## 2022-04-20 NOTE — INTERVAL H&P NOTE
Update History & Physical    The Patient's History and Physical was reviewed with the patient and I examined the patient. There was no change. The surgical site was confirmed by the patient and me. Plan:  The risk, benefits, expected outcome, and alternative to the recommended procedure have been discussed with the patient. Patient understands and wants to proceed with the procedure.     Electronically signed by Monika Tse MD on 4/20/2022 at 10:48 AM

## 2022-04-20 NOTE — OP NOTES
[address]  [address] [place] [address], [phone]    LEFT TOTAL KNEE ARTHROPLASTY    Patient: Tian Stanton MRN: [id number]  SSN: [id number]    YOB: 1965  Age: 61 y.o. Sex: male      Date of Surgery: 4/20/2022   Preoperative Diagnosis: OSTEOARTHRITIS LEFT KNEE   Postoperative Diagnosis: OSTEOARTHRITIS LEFT KNEE   Location: [facility]  Surgeon: Cj Burnette MD  Physician Assistant: NIKKI Doran was medically necessary for holding of retractors for exposure and to complete the case. Assistant: Circ-1: Bernardino Quintero RN  Physician Assistant: Susana Meehan PA-C  Scrub Tech-1: Traci Rodriguez  Scrub Tech-Relief: Rajani Monzon  Scrub RN-1: Jareth Mcnally RN  Surg Asst-1: Nicola Gomez  Surg Asst-Relief: Hina Scott    Anesthesia: Regional + Low femoral Nerve Block + local    Procedure: Left Total Knee Arthroplasty (CPT: 24633)    Findings:  Degenerative joint disease of the left knee     Estimated Blood Loss: 50 mL    Fluids: see anesthesia record    Specimens: None    Cultures: None    Complications: None    Tourniquet Time:   Total Tourniquet Time Documented:  Thigh (Left) - 60 minutes  Total: Thigh (Left) - 60 minutes    Tourniquet Pressure: 280 mm Hg    Tranexamic Acid: 1 gram IV: one dose at begining of case and one at the end    Exparel solution: 20 mL (13 mg/mL) + 40 mL NS    Implants:   Implant Name Type Inv.  Item Serial No.  Lot No. LRB No. Used Action   CEMENT BNE 40GM FULL DOSE PMMA W/O ANTIBIO HI VISC N RADPQ - [id number]  CEMENT BNE 40GM FULL DOSE PMMA W/O ANTIBIO HI VISC N RADPQ  [employer]WD [id number] Left 1 Implanted   CEMENT BNE 40GM FULL DOSE PMMA W/ GENT HI VISC RADPQ LNG - [id number]  CEMENT BNE 40GM FULL DOSE PMMA W/ GENT HI VISC RADPQ LNG  [employer] [id number] Left 1 Implanted   BASEPLATE TIB SZ 5 [id number] ML74MM L KNEE NP KRIS ABHINAV - [id number]  BASEPLATE TIB SZ 5 [id number] ML74MM L KNEE NP KRIS CARLYLEBrainard  [employer]WD [id number] Left 1 Implanted   COMPONENT PAT OD35MM RESURF XLPE OVL GEN II - [id number]  COMPONENT PAT OD35MM RESURF XLPE OVL GEN II  [employer]WD [id number] Left 1 Implanted   COMPONENT FEM SZ 5 L KNEE OXINIUM BI EULOGIOE [facility] - [id number]  COMPONENT FEM SZ 5 L KNEE OXINIUM BI CRUCE [facility] II  [employer]WD [id number] Left 1 Implanted   INSERT TIB SZ 5-6 THK9MM L OXINIUM XLPE [facility] - [id number]  INSERT TIB SZ 5-6 THK9MM L OXINIUM XLPE BI [facility] AND NEPH[employer]OVE Parker [id number] Left 1 Implanted        Patient Condition: Stable.    ---------  INDICATIONS:   This 61y.o.-year-old male has had pain in the left knee for years and has become functionally disabled with respect to the activities of daily living. The pain is increased with weight-bearing. The pain and dysfunction were not releaved by at least three months of conservative therapy such as NSAIDS (ibuprofen, aleve), analgesics (tylenol), structured flexibility and muscle strengthening physical therapy exercises, activity restrictions, intra-articular cortisone injections, bracing, and use of a cane. The radiographs showed valgus alignment and advanced arthritis with joint space narrowing, subchondral sclerosis, and periarticular osteophytes. A left total knee replacement has been recommended. The risks and the possible complications of this surgery and anesthesia including, but not exclusive to, bleeding, infection, dislocation, fracture, blood clots, nerve and vascular injury, possible need for other procedures, and possibly death have been explained to the patient. The patient accepts these risks for the benefits of joint replacement over the failure of non-operative care to provide adequate pain relief and improve their functional disability.     The patients medical problems have been addressed by their primary care physician and are deemed stable and as well controlled as possible. Inpatient hospital care was medically necessary, reasonable, and appropriate. This is a medically necessary procedure. As such, without use of a surgical assistant to retract soft tissues, protect vital neuro-vascular structures and assist in the technical aspects of the operation, this procedure would not have been possible. Therefore this assistant was medically necessary. DESCRIPTION OF PROCEDURE:    After the risks and benefits of surgery were discussed, informed consent was obtained. The patient was identified in the preoperative holding area and the operative extremity was marked. Preoperatively a low femoral nerve block was performed by anesthesia. The patient was taken to the operating room and placed in the supine position. Spinal anesthesia was performed. IV antibiotics were given. After verification of the appropriate operative site from the consent form, with confirmation of surgeon, anesthesia and nursing teams, a tourniquet was placed and the left leg was prepped and draped in sterile fashion using Chlorhexidine, hydrogen peroxide and Chlorhexidine, hydrogen peroxide and Chloraprep. A formal timeout was performed. The tourniquet was inflated and a midline incision was made over the anterior knee medial to the tibial tubercle and the dissection was taken down to Leonard's fascia. A medial parapatellar arthrotomy was performed, medial release was made and the infrapatellar fat pad was trimmed. The anterior portions of the medial and lateral menisci were excised. The patella measured 25 mm. A freehand patellar cut was made followed by placement of the protective plate. The knee was flexed and the patella was  allowed to subluxate into the lateral gutter and the knee was flexed.   Inspection of the knee revealed cartilage loss from all three compartments greatest laterally. The femur was drilled and intramedullary cutting jig was placed in 7 degrees of valgus and 9 mm of distal resection. The distal femoral cut was made. The tibia was then subluxed anteriorly with a large bent knee retractor. The PCL was released from the posterior tibia. The remaining medial and lateral menisci and the periarticular osteophytes were removed. The lateral genicular artery was cauterized. An intramedullary tibial guide was used and a tibial cut was made just inferior to the osteoarticular junction. The extension gap was assessed to ensure full extension could be achieved. The PCL was released as well as the IT band and popliteus. A sizing tibial plate was then pinned into place and alignment was checked. The tibia was reamed and broached and any additional osteophytes were removed. The femoral sizer was used to measure the femoral size as well as estimate femoral component rotation using the posterior condylar line as a reference. Gap measuring blocks and the gap /sizing device was then used to examine flexion and extension gaps and confirm femoral component size and rotation. Positioning pins for the distal femoral cutting block were placed into the femur through the /sizing device. The femoral cutting block was placed. Anterior-posterior position of the cutting guide was checked using a [employer]. The anterior, posterior and chamfer cuts were made. Posterior osteophytes were removed. Tibial and femoral trial implants were placed. The trochlear notch bone was removed. The patella preparation was then completed with sizing and drilling of the patellar lugs. The trial patellar implant was placed and measured 24 mm. Patellar tracking was lateral so a lateral retinacular release was needed. Ligament balancing was performed as needed with release of MCL done on approach as well as the ITB, popliteus and lateral capsule as well as the PCL.   The PCL was sacrificed so a BCS implant was used. Excellent stability was achieved. The trial components were removed. 20 cc of Exparel solution and 10 cc of 0.25% Marcaine were injected into the posterior soft tissues. Care was taken to aspirate prior to injecting to prevent intravascular anesthetic injection. The cement was prepared. After preparing the bony surfaces with pulsatile lavage saline, the real components were cemented into place with the final 9 mm BCS liner. Excess cement was removed prior to hardening with the cement allowed to set up with axial pressure across the knee in full extension. The remaining 40 cc of Exparel solution and an additional 20 cc of 0.25% Marcaine were injected into the deep and superficial soft tissues around the knee as well as the periosteum. After drying of the cement, the knee was checked for any remaining excess cement and irrigated. The tourniquet was released and hemostasis was achieved. A standard layered closure was performed using #1 Stratafix PDS for the fascia, 0 and 3-0 Vicryl for the subcutaneous and subcuticular layers followed by a running subcuticular skin closure with a 3-0 Monocryl. PRINEO was applied. Sterile dressings were placed. The patient was awakened and there were no complications. Final sponge and needle counts were correct x2.     Fara Lyle MD

## 2022-04-20 NOTE — PROGRESS NOTES
Problem: Self Care Deficits Care Plan (Adult)  Goal: *Acute Goals and Plan of Care (Insert Text)  Description: Initial Occupational Therapy Goals (4/20/2022) Within 7 day(s):    1. Patient will perform grooming standing sinkside with supervision for increased independence with ADLs. 2. Patient will perform LB dressing with supervision & A/E PRN for increased independence with ADLs. 3. Patient will perform toilet transfer with supervision for increased independence with ADLs. 4. Patient will perform all aspects of toileting with supervision for increased independence with ADLs. 5. Patient will independently apply energy conservation techniques with 1 verbal cue(s)for increased independence with ADLs. 6. Patient will perform bathroom mobility with supervision for increased independence/safety with ADLs. Outcome: Progressing Towards Goal  OCCUPATIONAL THERAPY EVALUATION    Patient: Shilpa Cartwright (61 y.o. male)  Date: 4/20/2022  Primary Diagnosis: Total knee replacement status, left [Z96.652]  Procedure(s) (LRB):  LEFT TOTAL KNEE ARTHROPLASTY AND ALL INDICATED PROCEDURES (Left) Day of Surgery   Precautions: Fall,WBAT  PLOF: pt mod I for ADLs/functional mobility    ASSESSMENT AND RECOMMENDATIONS:  Based on the objective data described below, the patient presents with LLE decreased ROM and strength affecting LE ADLs. Pt found supine in bed, vitals assessed and WNL, pt reporting pain 8/10, agreeable to therapy. Pt was able to sit up to EOB with CGA/additional time. Pt reporting buttocks/LLE numb from nerve block. Educated pt on proper body mechanics for ADLs s/p TKR. Pt normally uses ankle over knee method for sock/shoe donning, pt may benefit from A/E to maintain good body mechanics, will assess in future session. Pt was able to scoot along EOB with CGA/additional time, not safe for OOB ADLs d/t LE numbness. Pt returned to supine with min A, ice applied to L knee, call bell/phone within reach.  Patient will benefit from skilled Occupational Therapy intervention to maximize safety/independence with ADLs at d/c.    Education: Reviewed home safety, body mechanics, importance of moving every hour to prevent joint stiffness, role of ice for edema/pain control, Rolling Walker management/safety, and adaptive dressing techniques with patient verbalizing  understanding at this time     Patient will benefit from skilled intervention to address the above impairments. Patient's rehabilitation potential is considered to be Good  Factors which may influence rehabilitation potential include:   []             None noted  []             Mental ability/status  []             Medical condition  [x]             Home/family situation and support systems  []             Safety awareness  []             Pain tolerance/management  []             Other:        PLAN :  Recommendations and Planned Interventions:   [x]               Self Care Training                  [x]      Therapeutic Activities  [x]               Functional Mobility Training   []      Cognitive Retraining  [x]               Therapeutic Exercises           [x]      Endurance Activities  [x]               Balance Training                    []      Neuromuscular Re-Education  []               Visual/Perceptual Training     [x]      Home Safety Training  [x]               Patient Education                   [x]      Family Training/Education  []               Other (comment):    Frequency/Duration: Patient will be followed by Occupational Therapy 1-2 times per day/4-7 days per week to address goals. Discharge Recommendations: Home health   Further Equipment Recommendations for Discharge: N/A     SUBJECTIVE:   Patient stated I can't move this leg too well.     OBJECTIVE DATA SUMMARY:     Past Medical History:   Diagnosis Date    Arthritis     knees    [facility]immune disease ([facility]     HIV positive    Diabetes ([facility]     type 2    HIV disease ([facility]     Hypertension     Insomnia Psychiatric disorder     bipolar disorder     Past Surgical History:   Procedure Laterality Date    HX HEENT      TEETH EXTRACTION    HX KNEE ARTHROSCOPY Left     x 2    HX KNEE REPLACEMENT Right     partial    HX ORTHOPAEDIC Bilateral     CTR     Barriers to Learning/Limitations: yes;  physical and other (post-anesthesia)  Compensate with: visual, verbal, tactile, kinesthetic cues/model    Home Situation/Prior Level of Function:   Home Situation  Home Environment: Private residence  # Steps to Enter: 3  Rails to Enter: Yes  Hand Rails : Bilateral  One/Two Story Residence: One story  Living Alone: Yes  Support Systems: Other Family Member(s)  Patient Expects to be Discharged to[de-identified] Home with home health  Current DME Used/Available at Home: Cane, straight  Tub or Shower Type: Tub/Shower combination  []  Right hand dominant   []  Left hand dominant    Cognitive/Behavioral Status:  Neurologic State: Alert  Orientation Level: Oriented to person;Oriented to place;Oriented to situation  Cognition: Follows commands  Safety/Judgement: Awareness of environment    Skin: L knee incision w/ Mepilex   Edema: compression hose in place & applied ice     Coordination: BUE  Coordination: Within functional limits  Fine Motor Skills-Upper: Left Intact; Right Intact    Gross Motor Skills-Upper: Left Intact; Right Intact    Balance:  Sitting: Intact    Strength: BUE  Strength: Generally decreased, functional    Tone & Sensation:BUE  Tone: Normal  Sensation: Impaired (buttocks/L knee d/t spinal block)    Range of Motion: BUE  AROM: Generally decreased, functional    Functional Mobility and Transfers for ADLs:  Bed Mobility:  Supine to Sit: Contact guard assistance; Additional time (vc)  Sit to Supine: Minimum assistance; Additional time (vc)  Scooting: Contact guard assistance; Additional time (vc)    ADL Assessment:  Feeding: Independent  Oral Facial Hygiene/Grooming: Setup;Stand-by assistance (seated)  Bathing: Maximum assistance  Upper Body Dressing: Supervision  Lower Body Dressing: Moderate assistance  Toileting: Contact guard assistance    ADL Intervention:  Cognitive Retraining  Safety/Judgement: Awareness of environment    Pain:  Pain level pre-treatment: 8/10  Pain level post-treatment: 8/10  Pain Intervention(s): Medication administer by Nursing (see MAR); Rest, Ice, Repositioning   Response to intervention: Nurse notified, see doc flow     Activity Tolerance:   Fair. Patient able to sit EOB ~10 minute(s). Patient able to complete ADLs with intermittent rest breaks. Patient limited by pain, strength, ROM, numbness. Patient unsteady. Please refer to the flowsheet for vital signs taken during this treatment. After treatment:   [x]  Patient left in no apparent distress sitting up in chair  []  Patient sitting on EOB  []  Patient left in no apparent distress in bed  [x]  Call bell left within reach  [x]  Nursing notified  []  Caregiver present  [x]  Ice applied  [x]  SCD's on while back in bed  [] Bed alarm activated    COMMUNICATION/EDUCATION:   Communication/Collaboration:  [x]       Role of Occupational Therapy in the acute care setting. [x]      Home safety education was provided and the patient/caregiver indicated understanding. [x]      Patient/family have participated as able in goal setting and plan of care. [x]      Patient/family agree to work toward stated goals and plan of care. []      Patient understands intent and goals of therapy, but is neutral about his/her participation. []      Patient is unable to participate in plan of care at this time. Thank you for this referral.  Annita Araya OTR/LILIANA  Time Calculation: 23 mins    Eval Complexity: History: MEDIUM Complexity : Expanded review of history including physical, cognitive and psychosocial  history ;    Examination: MEDIUM Complexity : 3-5 performance deficits relating to physical, cognitive , or psychosocial skils that result in activity limitations and / or participation restrictions; Decision Making:MEDIUM Complexity : Patient may present with comorbidities that affect occupational performnce.  Miniml to moderate modification of tasks or assistance (eg, physical or verbal ) with assesment(s) is necessary to enable patient to complete evaluation

## 2022-04-20 NOTE — PERIOP NOTES
Anesthesia paged for sign out at this time. Patient meets criteria for transfer to next phase of recovery. clear bilaterally.  Pupils equal, round, and reactive to light.

## 2022-04-20 NOTE — ANESTHESIA POSTPROCEDURE EVALUATION
Procedure(s):  LEFT TOTAL KNEE ARTHROPLASTY AND ALL INDICATED PROCEDURES.    spinal    Anesthesia Post Evaluation      Multimodal analgesia: multimodal analgesia used between 6 hours prior to anesthesia start to PACU discharge  Patient location during evaluation: PACU  Patient participation: complete - patient participated  Level of consciousness: awake and alert  Pain score: 4  Airway patency: patent  Anesthetic complications: no  Cardiovascular status: acceptable  Respiratory status: acceptable  Post anesthesia nausea and vomiting:  none  Final Post Anesthesia Temperature Assessment:  Normothermia (36.0-37.5 degrees C)      INITIAL Post-op Vital signs:   Vitals Value Taken Time   /78 04/20/22 1710   Temp 36.7 °C (98 °F) 04/20/22 1710   Pulse 71 04/20/22 1710   Resp 14 04/20/22 1710   SpO2 100 % 04/20/22 1710

## 2022-04-20 NOTE — PROGRESS NOTES
Problem: Mobility Impaired (Adult and Pediatric)  Goal: *Acute Goals and Plan of Care (Insert Text)  Description: In 1-2 days pt will be able to perform:  ST.  Bed mobility:  Rolling L to R to L modified independent for positioning. 2.  Supine to sit to supine S with HR for meals. 3.  Sit to stand to sit S with RW in prep for ambulation. LT.  Gait:  Ambulate >150ft S with RW, WBAT, for home/community mobility. 2.  Stair Negotiation:  Ascend/descend >2 steps CGA with HR for home entry. 3.  Activity tolerance: Tolerate up in chair 1-2 hours for ADL's.  4.  Patient/Family Education:  Patient/family to be independent with HEP for follow-up care and safe discharge. Note: []  Patient has met MD andrea arriaga for d/c home   []  Recommend HH with 24 hour adult care   [x]  Benefit from additional acute PT session to address:  transfer training, gait training, stair training    PHYSICAL THERAPY EVALUATION    Patient: Shilpa Cartwright (61 y.o. male)  Date: 2022  Primary Diagnosis: Total knee replacement status, left [Z96.652]  Procedure(s) (LRB):  LEFT TOTAL KNEE ARTHROPLASTY AND ALL INDICATED PROCEDURES (Left) Day of Surgery   Precautions:   Fall,WBAT    PLOF: Independent    ASSESSMENT :  Based on the objective data described below, the patient presents with decreased mobility in regards to bed mobility, transfers, gt quality and tolerance, balance, stair negotiation and safety due to L TKA surgery. Decreased AROM of L knee, dec strength of L knee, pain in L knee, dec sensation of L knee also impacting pt functional mobility. Pt rating pain on numerical pain scale pre/post and during session 8/10. Pt ed regarding mobility safety, WB, HEP, ice application/use, elevation, environmental safety and need to use call bell for OOB activity. Pt able to perform supine<>sit w/ CGA/min A. Safety vc required throughout session to reinforce safety.   Sitting EOB pt reports glut numbness and inability to actively move L LE requiring A for moving L LE. Pt able to scoot laterally along EOB w/ CGA. Deferred sit>stand, gait training until tomorrow. Answered questions by pt in regards to PT and mobility. Pt left supine in bed w/ all needs within reach, B SCD reapplied and ice pack to L knee. Nurse Rosario aware of session and outcomes. Recommend HHPT with responsible adult care at least 24 hours upon hospital d/c. Patient will benefit from skilled intervention to address the above impairments. Patient's rehabilitation potential is considered to be Good  Factors which may influence rehabilitation potential include:   []         None noted  []         Mental ability/status  []         Medical condition  []         Home/family situation and support systems  []         Safety awareness  [x]         Pain tolerance/management  []         Other:      PLAN :  Recommendations and Planned Interventions:   [x]           Bed Mobility Training             []    Neuromuscular Re-Education  [x]           Transfer Training                   []    Orthotic/Prosthetic Training  [x]           Gait Training                          [x]    Modalities  [x]           Therapeutic Exercises           [x]    Edema Management/Control  [x]           Therapeutic Activities            [x]    Family Training/Education  [x]           Patient Education  []           Other (comment):    Frequency/Duration: Patient will be followed by physical therapy 1-2 times per day/4-7 days per week to address goals. Discharge Recommendations: Home Health  Further Equipment Recommendations for Discharge: N/A     SUBJECTIVE:   Patient stated I am hurting but the nerve block is still working because I can't move this leg much.     OBJECTIVE DATA SUMMARY:     Past Medical History:   Diagnosis Date    Arthritis     knees    Autoimmune disease ([facility]     HIV positive    Diabetes ([facility]     type 2    HIV disease ([facility]     Hypertension     Insomnia Psychiatric disorder     bipolar disorder     Past Surgical History:   Procedure Laterality Date    HX HEENT      TEETH EXTRACTION    HX KNEE ARTHROSCOPY Left     x 2    HX KNEE REPLACEMENT Right     partial    HX ORTHOPAEDIC Bilateral     CTR     Barriers to Learning/Limitations: yes;  anesthesia  Compensate with: Visual Cues, Verbal Cues, and Tactile Cues  Home Situation:  Home Situation  Home Environment: Private residence  # Steps to Enter: 3  Rails to Enter: Yes  Hand Rails : Bilateral  One/Two Story Residence: One story  Living Alone: Yes  Support Systems: Other Family Member(s)  Patient Expects to be Discharged to[de-identified] Home with home health  Current DME Used/Available at Home: Cane, straight,Walker, rolling  Tub or Shower Type: Tub/Shower combination  Critical Behavior:  Neurologic State: Alert  Orientation Level: Oriented to person;Oriented to place;Oriented to situation  Cognition: Follows commands  Safety/Judgement: Awareness of environment  Psychosocial  Patient Behaviors: Calm; Cooperative  Skin Condition/Temp: Dry;Warm  Skin Integrity: Incision (comment) (LEFT knee)  Skin Integumentary  Skin Color: Appropriate for ethnicity  Skin Condition/Temp: Dry;Warm  Skin Integrity: Incision (comment) (LEFT knee)  Turgor: Non-tenting  Strength:    Strength: Generally decreased, functional  Tone & Sensation:   Tone: Normal  Sensation: Impaired (L knee)  Range Of Motion:  AROM: Generally decreased, functional  Posture:  Functional Mobility:  Bed Mobility:  Supine to Sit: Contact guard assistance; Additional time (vc)  Sit to Supine: Minimum assistance; Additional time (vc)  Scooting: Contact guard assistance; Additional time (vc)  Transfers:  Balance:   Sitting: Intact  Ambulation/Gait Training:  Left Side Weight Bearing: As tolerated  Stairs: Therapeutic Exercises:   Encouraged HEP  Pain:  Pain level pre-treatment: 8/10   Pain level post-treatment: 8/10   Pain Intervention(s) : Medication (see MAR);  Rest, Ice, Repositioning  Response to intervention: Nurse notified, See doc flow    Activity Tolerance:   Fair   Please refer to the flowsheet for vital signs taken during this treatment. After treatment:   []         Patient left in no apparent distress sitting up in chair  [x]         Patient left in no apparent distress in bed  [x]         Call bell left within reach  [x]         Nursing notified  []         Caregiver present  []         Bed alarm activated  [x]         SCDs applied    COMMUNICATION/EDUCATION:   [x]         Role of Physical Therapy in the acute care setting. [x]         Fall prevention education was provided and the patient/caregiver indicated understanding. [x]         Patient/family have participated as able in goal setting and plan of care. [x]         Patient/family agree to work toward stated goals and plan of care. []         Patient understands intent and goals of therapy, but is neutral about his/her participation. []         Patient is unable to participate in goal setting/plan of care: ongoing with therapy staff.  []         Other:     Thank you for this referral.  Elina Torres, PT   Time Calculation: 23 mins      Eval Complexity: History: HIGH Complexity :3+ comorbidities / personal factors will impact the outcome/ POC Exam:MEDIUM Complexity : 3 Standardized tests and measures addressing body structure, function, activity limitation and / or participation in recreation  Presentation: MEDIUM Complexity : Evolving with changing characteristics  Clinical Decision Making:High Complexity    Overall Complexity:MEDIUM

## 2022-04-20 NOTE — INTERVAL H&P NOTE
Update History & Physical    The Patient's History and Physical was reviewed with the patient and I examined the patient. There was no change. The surgical site was confirmed by the patient and me. Plan:  The risk, benefits, expected outcome, and alternative to the recommended procedure have been discussed with the patient. Patient understands and wants to proceed with the procedure.     Electronically signed by Eli Walker MD on 4/20/2022 at 12:13 PM

## 2022-04-21 VITALS
WEIGHT: 251.2 LBS | TEMPERATURE: 98.1 F | DIASTOLIC BLOOD PRESSURE: 66 MMHG | RESPIRATION RATE: 16 BRPM | HEIGHT: 67 IN | SYSTOLIC BLOOD PRESSURE: 124 MMHG | HEART RATE: 92 BPM | OXYGEN SATURATION: 99 % | BODY MASS INDEX: 39.43 KG/M2

## 2022-04-21 LAB
GLUCOSE BLD STRIP.AUTO-MCNC: 117 MG/DL (ref 70–110)
GLUCOSE BLD STRIP.AUTO-MCNC: 185 MG/DL (ref 70–110)

## 2022-04-21 PROCEDURE — 74011636637 HC RX REV CODE- 636/637: Performed by: PHYSICIAN ASSISTANT

## 2022-04-21 PROCEDURE — 74011000250 HC RX REV CODE- 250: Performed by: PHYSICIAN ASSISTANT

## 2022-04-21 PROCEDURE — 97116 GAIT TRAINING THERAPY: CPT

## 2022-04-21 PROCEDURE — 82962 GLUCOSE BLOOD TEST: CPT

## 2022-04-21 PROCEDURE — 77030012893

## 2022-04-21 PROCEDURE — 74011250637 HC RX REV CODE- 250/637: Performed by: PHYSICIAN ASSISTANT

## 2022-04-21 PROCEDURE — 74011250636 HC RX REV CODE- 250/636: Performed by: PHYSICIAN ASSISTANT

## 2022-04-21 RX ORDER — ASPIRIN 81 MG/1
81 TABLET ORAL 2 TIMES DAILY
Qty: 84 TABLET | Refills: 0 | Status: SHIPPED | OUTPATIENT
Start: 2022-04-21 | End: 2022-12-06 | Stop reason: CLARIF

## 2022-04-21 RX ORDER — OXYCODONE AND ACETAMINOPHEN 5; 325 MG/1; MG/1
1-2 TABLET ORAL
Qty: 56 TABLET | Refills: 0 | Status: SHIPPED | OUTPATIENT
Start: 2022-04-21 | End: 2022-04-28

## 2022-04-21 RX ORDER — NALOXONE HYDROCHLORIDE 4 MG/.1ML
SPRAY NASAL
Qty: 1 EACH | Refills: 0 | Status: SHIPPED | OUTPATIENT
Start: 2022-04-21

## 2022-04-21 RX ORDER — NALOXONE HYDROCHLORIDE 4 MG/.1ML
SPRAY NASAL
Qty: 1 EACH | Refills: 0 | Status: SHIPPED | OUTPATIENT
Start: 2022-04-21 | End: 2022-12-06 | Stop reason: CLARIF

## 2022-04-21 RX ORDER — ASPIRIN 81 MG/1
81 TABLET ORAL 2 TIMES DAILY
Qty: 84 TABLET | Refills: 0 | Status: SHIPPED | OUTPATIENT
Start: 2022-04-21

## 2022-04-21 RX ORDER — ACETAMINOPHEN 325 MG/1
325 TABLET ORAL
Qty: 60 TABLET | Refills: 0 | Status: SHIPPED | OUTPATIENT
Start: 2022-04-21 | End: 2022-12-15

## 2022-04-21 RX ORDER — ACETAMINOPHEN 325 MG/1
325 TABLET ORAL
Qty: 60 TABLET | Refills: 0 | Status: SHIPPED | OUTPATIENT
Start: 2022-04-21 | End: 2022-12-06 | Stop reason: CLARIF

## 2022-04-21 RX ADMIN — Medication 1 TABLET: at 09:45

## 2022-04-21 RX ADMIN — FERROUS SULFATE TAB 325 MG (65 MG ELEMENTAL FE) 325 MG: 325 (65 FE) TAB at 09:45

## 2022-04-21 RX ADMIN — KETOROLAC TROMETHAMINE 15 MG: 30 INJECTION, SOLUTION INTRAMUSCULAR at 06:08

## 2022-04-21 RX ADMIN — OXYCODONE HYDROCHLORIDE 10 MG: 5 TABLET ORAL at 14:13

## 2022-04-21 RX ADMIN — OXYCODONE HYDROCHLORIDE 10 MG: 5 TABLET ORAL at 02:01

## 2022-04-21 RX ADMIN — OXYCODONE HYDROCHLORIDE 10 MG: 5 TABLET ORAL at 09:52

## 2022-04-21 RX ADMIN — ASPIRIN 81 MG: 81 TABLET, COATED ORAL at 09:45

## 2022-04-21 RX ADMIN — SODIUM CHLORIDE, PRESERVATIVE FREE 10 ML: 5 INJECTION INTRAVENOUS at 06:08

## 2022-04-21 RX ADMIN — ATORVASTATIN CALCIUM 10 MG: 10 TABLET, FILM COATED ORAL at 09:44

## 2022-04-21 RX ADMIN — ACETAMINOPHEN 1000 MG: 500 TABLET ORAL at 09:43

## 2022-04-21 RX ADMIN — CEFAZOLIN 2 G: 10 INJECTION, POWDER, FOR SOLUTION INTRAVENOUS at 06:08

## 2022-04-21 RX ADMIN — OXYCODONE HYDROCHLORIDE 10 MG: 5 TABLET ORAL at 06:07

## 2022-04-21 RX ADMIN — Medication 2 UNITS: at 12:46

## 2022-04-21 RX ADMIN — KETOROLAC TROMETHAMINE 15 MG: 30 INJECTION, SOLUTION INTRAMUSCULAR at 11:28

## 2022-04-21 RX ADMIN — ACETAMINOPHEN 1000 MG: 500 TABLET ORAL at 02:02

## 2022-04-21 RX ADMIN — AMLODIPINE BESYLATE 5 MG: 5 TABLET ORAL at 09:45

## 2022-04-21 NOTE — DISCHARGE SUMMARY
[address] [facility]   [address] [facility] [place] [address]     DISCHARGE SUMMARY     PATIENT: Tian Stanton     MRN: [id number]   ADMIT DATE: 2022   [place]   DISCHARGE DATE:  2022     ATTENDING: Yudy Grewal MD   DICTATING: Marisela Llanos PA-C     ADMISSION DIAGNOSIS: Total knee replacement status, left [Z96.652]    DISCHARGE DIAGNOSIS: Status post OSTEOARTHRITIS LEFT KNEE    HISTORY OF PRESENT ILLNESS: The patient is a 61y.o. year-old male   with ongoing left knee pain secondary to osteoarthritis of his left knee. The patient's pain has persisted and progressed despite conservative treatments and therapies. The patient has at this time opted for surgical intervention. PAST MEDICAL HISTORY:   Past Medical History:   Diagnosis Date    Arthritis     knees    Autoimmune disease ([address]     HIV positive    [facility] ([facility]     type 2    [facility] ([facility]     Hypertension     Insomnia     Psychiatric disorder     bipolar disorder       PAST SURGICAL HISTORY:   Past Surgical History:   Procedure Laterality Date    HX HEENT      TEETH EXTRACTION    HX KNEE ARTHROSCOPY Left     x 2    HX KNEE REPLACEMENT Right     partial    HX ORTHOPAEDIC Bilateral     CTR       ALLERGIES:   Allergies   Allergen Reactions    Soy Shortness of Breath and Swelling     No longer allergic    Pcn [Penicillins] Rash and Itching        CURRENT MEDICATIONS:  A list of medications prior to the time of admission include:  Prior to Admission medications    Medication Sig Start Date End Date Taking? Authorizing Provider   docusate sodium (COLACE) 50 mg capsule Take 2 Capsules by mouth three (3) times daily as needed for Constipation for up to 90 days. 22 Yes Anastasia Anderson PA-C   aspirin delayed-release (Ecotrin Low Strength) 81 mg tablet Take 1 Tablet by mouth two (2) times a day.  22  Yes Anastasia Anderson PA-C   naloxone (Narcan) 4 mg/actuation nasal spray Use 1 spray intranasally, then discard. Repeat with new spray every 2 min as needed for opioid overdose symptoms, alternating nostrils. 4/21/22  Yes Macy Landa PA-C   oxyCODONE-acetaminophen (PERCOCET) 5-325 mg per tablet Take 1-2 Tablets by mouth every four to six (4-6) hours as needed for Pain for up to 7 days. Max Daily Amount: 12 Tablets. 4/21/22 4/28/22 Yes Macy Landa PA-C   acetaminophen (TylenoL) 325 mg tablet Take 1 Tablet by mouth two (2) times daily as needed for Pain. 4/21/22  Yes Macy Landa PA-C   asenapine (Secuado) 5.7 mg/24 hour pt24 1 Patch by TransDERmal route daily. Yes Provider, Historical   dnchzkhte-bqghlpqe-ugtdenw ala (Biktarvy) tab tablet Take 1 Tablet by mouth daily. Indications: HIV   Yes Provider, Historical   darunavir-cobicistat (Prezcobix) 800-150 mg-mg per tablet Take 1 Tablet by mouth daily. Yes Provider, Historical   diazePAM (VALIUM) 10 mg tablet Take 10 mg by mouth every eight (8) hours as needed for Anxiety. Yes Provider, Historical   atorvastatin (LIPITOR) 10 mg tablet Take 10 mg by mouth daily. Yes Provider, Historical   amLODIPine (NORVASC) 5 mg tablet Take 5 mg by mouth daily. Yes Provider, Historical   insulin detemir U-100 (LEVEMIR FLEXTOUCH) 100 unit/mL (3 mL) inpn 30 Units by SubCUTAneous route daily. Yes Provider, Historical   insulin aspart protamine/insulin aspart (NovoLOG Mix 70-30FlexPen U-100) 100 unit/mL (70-30) inpn 30 Units by SubCUTAneous route three (3) times daily. Yes Provider, Historical   acetaminophen (TYLENOL) 325 mg tablet Take 2 Tabs by mouth four (4) times daily. 2/10/15  Yes Maggie Johnson MD       FAMILY HISTORY: History reviewed. No pertinent family history.     SOCIAL HISTORY:   Social History     Socioeconomic History    Marital status: SINGLE   Tobacco Use    Smoking status: Former Smoker     Quit date: 4/6/2019     Years since quitting: 3.0    Smokeless tobacco: Never Used   Vaping Use    Vaping Use: Never used   Substance and Sexual Activity    Alcohol use: Not Currently    Drug use: Not Currently     Comment: H/O of crack cocaine use,        REVIEW OF SYSTEMS: All review of systems are negative. PHYSICAL EXAMINATION: For a detailed physical exam, please refer to the patient's chart. HOSPITAL COURSE: The patient was taken to surgery the day of admission. he underwent a left total knee replacement. Operative course was benign. Estimated blood loss was approximately 50 cc. The patient was taken to the PACU in stable condition and was later taken to the floor in stable condition. During his hospital stay, the patient progressed well with physical therapy and occupational therapy, adherent to instructions. he had been cleared by physical therapy with stair training. he was placed on Aspirin for DVT prophylaxis. his pain has been well controlled with oral pain medications. his vitals have remained stable. he has also remained hemodynamically stable. The patient has been recommended for discharge home on POD#1. DISCHARGE INSTRUCTIONS: The patient is to be discharged home with home health. he is to continue on his prior medications per the medication reconciliation form, to which we will add:  1. Ecotrin 81 mg; 1 tablet p.o. Twice daily X 6 weeks  2. Colace 100 mg po TID prn constipation  3. Percocet 5/325 mg; 1-2 tablets p.o. every 4 to 6 hours p.r.n. for pain  4. Tylenol 325 mg 1 tablet p.o. twice daily to be taken in between doses of Norco for breakthrough pain. 5. Narcan 4mg/actuation. Use .1ml spray intranasally, repeat with new spray every 2-3 min as needed for opioid overdose symptoms, alternating nostrils. The patient is to continue at home with home physical therapy 3 times a week to work on gait training, range of motion, strengthening, and weightbearing exercises as tolerated on his left lower extremity.   The patient is to progress from a walker to a cane to complete total weightbearing as tolerable. The patient is to continue to keep his incision dry. The patient is to followup with Dr. Ulysses Grist in the office approximately 1-2 weeks status post for x-rays and further evaluation.     Rachid Jones PA-C  4/21/20229:06 AM

## 2022-04-21 NOTE — ROUTINE PROCESS
Bedside and Verbal shift change report given to SINTIA Wright RN (oncoming nurse) by TALA Cazares RN (offgoing nurse). Report included the following information SBAR, Kardex, Intake/Output and MAR.

## 2022-04-21 NOTE — PROGRESS NOTES
4269  Bedside shift change report given to [address] (oncoming nurse) by Samira Rea RN (offgoing nurse). Report included the following information SBAR, Kardex, Intake/Output and MAR.     0730  Assumed care at this time. Patient alert and oriented x4. Denies SOB, chest pain. Shows no signs of distress. Patient lungs clear diminished bilaterally. Cap refill < 3 sec to all extremities. Patient has 20 G IV to [facility] [place], operated by [facility] CDI. Stated pain 5/10. Dressing to L knee is CDI. Plexis and alex stockings applied to BLE. Incentive spirometer at bedside. Patient reaches 2000 on IS. Bowel sounds present. Skin intact. Patient call light and possessions within reach. Bed locked and in lowest position. Nurse will continue to monitor.      1400  Per Dr. Bibiana Miranda, patient is cleared and may d.c.

## 2022-04-21 NOTE — PROGRESS NOTES
2014-alert and oriented x 4. Lungs CTA, diminished on RA. BS active x 4 quads. IV access to right ac infusing LR at 100 ml/hr without difficulty. Incentive spirometer up to 2000, using during commercials. Plexis in place and functioning. Ace wrap to left leg, CDI. Pain rated at 6/10 and increasing. Patient with no suicidal ideation at this time. 2131-pain rated 8/10, Oxycodone 10 mg given. 2217-pain decreased to 5/10.    2313-Patient said pain had increased up to 9/10. IV Dilaudid given, left leg elevated on wedge. 0007-pain rated 6/10.    0201-pain rated at 7/10, Oxycodone 10 mg given. 0300-Pain rated 7/10.  0607-Oxycodone 10 mg given for pain rated 7/10.  0701-pain decreased to 6/10. Shift summary-patient voiding without difficulty. Continues to have c/o not being able to lift his leg. Pain controlled with oxycodone 10 mg and Dilaudid IV x one dose. IV ATB complete. IVF stopped.

## 2022-04-21 NOTE — ROUTINE PROCESS
1659  TRANSFER - IN REPORT:    Verbal report received from Elana Parish on Jenelle Molt  being received from PACU for routine post - op      Report consisted of patients Situation, Background, Assessment and   Recommendations(SBAR). Information from the following report(s) SBAR, Kardex, OR Summary, Intake/Output, MAR, and Recent Results was reviewed with the receiving nurse. Opportunity for questions and clarification was provided. Assessment will be completed upon patients arrival to unit and care assumed.

## 2022-04-21 NOTE — PROGRESS NOTES
Problem: Mobility Impaired (Adult and Pediatric)  Goal: *Acute Goals and Plan of Care (Insert Text)  Description: In 1-2 days pt will be able to perform:  ST.  Bed mobility:  Rolling L to R to L modified independent for positioning. 2.  Supine to sit to supine S with HR for meals. 3.  Sit to stand to sit S with RW in prep for ambulation. LT.  Gait:  Ambulate >150ft S with RW, WBAT, for home/community mobility. 2.  Stair Negotiation:  Ascend/descend >2 steps CGA with HR for home entry. 3.  Activity tolerance: Tolerate up in chair 1-2 hours for ADL's.  4.  Patient/Family Education:  Patient/family to be independent with HEP for follow-up care and safe discharge. Outcome: Progressing Towards Goal   [x]  Patient has met MD mobilization bart for d/c home   []  Recommend HH with 24 hour adult care   []  Benefit from additional acute PT session to address:      PHYSICAL THERAPY TREATMENT    Patient: Jayro Giraldo (56 y.o. male)  Date: 2022  Diagnosis: Total knee replacement status, left [Z96.652] <principal problem not specified>  Procedure(s) (LRB):  LEFT TOTAL KNEE ARTHROPLASTY AND ALL INDICATED PROCEDURES (Left) 1 Day Post-Op  Precautions: Fall,WBAT  PLOF: independent, ambulatory without AD, has a RW    ASSESSMENT:  Pt in bed upon arrival.  No assistance needed to sit up EOB. Elevated bed to height of bed at home, no difficulty performing sit to stand. Ambulated with RW, 100ft, very very slow pace, step to gt pattern, no LOB or knee buckling. Negotiated 2 steps holding (B) hand rails. Returned to room and back to supine. Verbally reviewed HEP hand out. Progression toward goals:   [x]      Improving appropriately and progressing toward goals  []      Improving slowly and progressing toward goals  []      Not making progress toward goals and plan of care will be adjusted     PLAN:  Patient continues to benefit from skilled intervention to address the above impairments. Continue treatment per established plan of care. Discharge Recommendations:  Home Health  Further Equipment Recommendations for Discharge:  rolling walker     SUBJECTIVE:   Patient stated Last night was horrible.     OBJECTIVE DATA SUMMARY:   Critical Behavior:  Neurologic State: Alert  Orientation Level: Oriented X4  Cognition: Follows commands  Safety/Judgement: Awareness of environment  Functional Mobility Training:  Bed Mobility:    Supine to Sit: Supervision  Sit to Supine: Supervision  Scooting: Supervision  Transfers:  Sit to Stand: Supervision  Stand to Sit: Supervision;Stand-by assistance  Balance:  Sitting: Intact  Standing: Intact; With support   Ambulation/Gait Training:  Distance (ft): 100 Feet (ft)  Assistive Device: Gait belt;Walker, rolling  Ambulation - Level of Assistance: Modified independent  Gait Abnormalities: Antalgic;Decreased step clearance; Step to gait  Left Side Weight Bearing: As tolerated  Speed/Rhonda: Slow  Stairs:  Number of Stairs Trained: 2  Stairs - Level of Assistance: Stand-by assistance;Supervision  Rail Use: Both        Pain:  Pain level pre-treatment: 7/10  Pain level post-treatment: 7/10   Pain Intervention(s): Medication (see MAR); Rest, Ice, Repositioning   Response to intervention: Nurse notified, See doc flow    Activity Tolerance:   Fair+  Please refer to the flowsheet for vital signs taken during this treatment. After treatment:   [] Patient left in no apparent distress sitting up in chair  [x] Patient left in no apparent distress in bed  [x] Call bell left within reach  [x] Nursing notified  [x] Caregiver present  [] Bed alarm activated  [x] SCDs applied      COMMUNICATION/EDUCATION:   [x]         Role of Physical Therapy in the acute care setting. [x]         Fall prevention education was provided and the patient/caregiver indicated understanding. [x]         Patient/family have participated as able in working toward goals and plan of care.   [x] Patient/family agree to work toward stated goals and plan of care. []         Patient understands intent and goals of therapy, but is neutral about his/her participation.   []         Patient is unable to participate in stated goals/plan of care: ongoing with therapy staff.  []         Other:        Christiana Butler, PTA   Time Calculation: 37 mins

## 2022-04-21 NOTE — ROUTINE PROCESS
Bedside and Verbal shift change report given to TALA Cazares RN (oncoming nurse) by [employer] RN (offgoing nurse). Report included the following information SBAR, Kardex, Intake/Output and MAR.

## 2022-04-21 NOTE — PROGRESS NOTES
Progress Note        Patient: Fabi Gutierrez MRN: [id number]  SSN: [id number]    YOB: 1965  Age: 61 y.o. Sex: male      1 Day Post-Op status post Procedure(s) (LRB):  LEFT TOTAL KNEE ARTHROPLASTY AND ALL INDICATED PROCEDURES (Left)    Admit Date: 2022  Admit Diagnosis: Total knee replacement status, left [Z96.652]    Subjective:      POD#1 Doing well. Complains of post-operative pain. No SOB. No Chest Pain. No Vomiting. No problems eating or voiding. Mild nausea yesterday pm. No O/N events    Objective:        Temp (24hrs), Av.1 °F (36.7 °C), Min:97.1 °F (36.2 °C), Max:99.1 °F (37.3 °C)    Body mass index is 39.34 kg/m². Patient Vitals for the past 12 hrs:   BP Temp Pulse Resp SpO2   22 0825 (!) 113/59 98.3 °F (36.8 °C) 64 16 99 %   22 0203 120/71 97.7 °F (36.5 °C) 72 16 95 %   22 2315 132/84 -- 85 16 100 %     No results for input(s): HGB, HCT, INR, NA, K, CL, CO2, BUN, CREA, GLU, HGBEXT, HCTEXT, INREXT in the last 72 hours. Physical Exam:  Vital Signs are Stable. No Acute Distress. Alert and Oriented. Negative Homans sign. Toes AROM Full. Neurovascular exam is normal.    Dressing is Clean, Dry, and Intact. Assessment/Plan:     1. Patient doing well. POD#1  2. Out of BED / PT / WBAT  3. DVT ppx: Mobilization, Ecotrin, KAILEY hose, and mechanical compression  4.  D/c today pending approval from PT/OT and psychiatric consult    Continue PT/OT  Continue ROM    Discharge Plan: Home    Signed By: Azam Fink PA-C     2022

## 2022-04-21 NOTE — ROUTINE PROCESS
Discharge instructions reviewed with the patient. Patient verbalized understanding and verified by teach back. All questions answered. IV discontinued, no redness, swelling or pain noted. Patient awaiting family for transportation home with an ETA of now. Patient discharged off the unit via wheelchair. Patient armband removed and shredded.

## 2022-04-21 NOTE — NURSE NAVIGATOR
Louie Littlejohn Rounded on post total knee replacement. Patient educated: Activity:   OOB for all meals,   Walk short distances every hour during the day and evening to promote circulation, help move better and lessen stiffness. Also helps to get the muscles stretched and strong. When elevating leg and sitting do not put anything under knee. IT is important to work on getting the leg stretched out and as straight as possible. Promoting circulation  Ankle pumps 10 times an hour at hospital & home. Take medications as prescribed by provider. Pain Control:  Pain medications side effects of constipation, nausea, dizziness, itching reviewed. Reminded patient swelling, bruising and increased pain are normal at home. To help decrease swelling after surgery it is safe to lie down and elevate legs above heart to help decrease swelling. Use pillows while keeping the surgical knee straight when elevating. Use ice, distraction, moving, & change position to help with pain. Rest between activity. Educated that medications can cause nausea and decreased appetite so eat a snack before taking medication. Narcotics cause constipation so take stool softener/mild laxative daily while on narcotics. Incentive Spirometry:    Use of incentive spirometer 10 x/hr. Diet:   Eat for healing. Drink plenty of fluids so urine is lemonade in color. Patient Safety:   Call light & belongings in reach. Call for help when want to walk or get OOB.        Mobility Intervention:       [] Pt dangled at edge of bed    [] Pt assisted OOB to bedside commode    [x] Pt assisted OOB to chair    [] Pt ambulated to bathroom    [] Patient was ambulated in room/hallway    Assistive Device Utilized:       [x] Rolling walker   [] Crutches   [] Back Brace   [] Knee immobilizer   [] Neck Collar    After Rounding and Checking on Patient     [x] Patient left in no apparent distress sitting up in chair  [] Patient left in no apparent distress in bed  [x] Call bell left within reach  [] SCDs on both legs & machine turned on  [] Knee immobilizer on  [x] Ice applied  [x] RN notified  []  present  [] Bed alarm activated    Reason patient not mobilized:      [] Patient refused   [] Nausea/vomiting   [] Low blood pressure   [] Drowsy/lethargic    Pain Rating:     [] 1  [] 6  [] 2  [] 7  [] 3  [] 8  [] 4  [] 9  [] 5  [] 10    Left patient with call light, cell phone and personal belongings in reach for safety. Jose Soto verbalized understand. Given the opportunity for asking questions.       Nurse Navigator

## 2022-04-21 NOTE — PROGRESS NOTES
Transition of Care (OZZY) Plan:          Pt admitted for an elective surgical procedure- patient had left total knee arthroplasty on 4/20/22. Pt is independent, lives with his brother in law and sister; has rolling walker and calf pump machine at home. Verified FOC for [facility]. Patient is aware that he will be seen by psychiatrist prior to discharge. Please encourage ambulation. No transition of care needs identified at this time. Anticipate pt will be medically stable for discharge within the next 24-48 hours with physician follow up. CM available to assist as needed. OZZY Transportation:   How is patient being transported at discharge? Family/Friend      When? Once cleared by physician     Is transport scheduled? N/A      Follow-up appointment and transportation:   PCP/Specialist?  See AVS for Appointment         Who is transporting to the follow-up appointment? Self/Family/Friend      Is transport for follow up appointment scheduled? N/A    Communication plan (with patient/family): Who is being called? Patient or Next of Kin? Responsible party? Patient      What number(s) is to be used? See Facesheet      What service provider is calling for [facility]? When are they calling? Readmission Risk? (Green/Low; Yellow/Moderate; Red/High):  [employer] here to complete [employer] including selection of the Healthcare Decision Maker Relationship (ie \"Primary\")    Care Management Interventions  PCP Verified by CM:  Yes  Mode of Transport at Discharge: Self  Transition of Care Consult (CM Consult): Discharge [address]: No  Reason Outside [place]: Physician referred to specific agency  Support Systems: Other Family Member(s)  Confirm Follow Up Transport: Family  The Plan for Transition of Care is Related to the Following Treatment Goals : left total knee arthroplasty  The Patient and/or Patient Representative was Provided with a Choice of Provider and Agrees with the Discharge Plan?: Yes  Name of the Patient Representative Who was Provided with a Choice of Provider and Agrees with the Discharge Plan: Parley Duverney, patient  [place] of Choice List was Provided with Basic Dialogue that Supports the Patient's Individualized Plan of Care/Goals, Treatment Preferences and Shares the Quality Data Associated with the Providers?: Yes  Discharge Location  Patient Expects to be Discharged to[de-identified] Home with home health

## 2022-04-21 NOTE — DISCHARGE INSTRUCTIONS
[facility]     Patient Discharge Instructions    Mina Melgar / [id number] : 1965    Admitted 2022 Discharged: 2022     IF YOU HAVE ANY PROBLEMS ONCE YOU ARE AT HOME CALL THE FOLLOWING NUMBERS:   Main office number: [phone]    Your follow up appointment to see either Dr. Lauri Nascimento is scheduled in 1-2 weeks. If you are unsure of your appointment date call the office at [phone]. Take Home Medications     · Resume your home medictions as directed  · A prescription for pain medication has been given   · It is important that you take the medication exactly as they are prescribed. · Keep your medication in the bottles provided by the pharmacist and keep a list of the medication names, dosages, and times to be taken in your wallet. · Do not take other medications without consulting your doctor. · Note:  If you have already received and/or filled a prescription for one or more of the medications you've received a prescription for when leaving the hospital, you may disguard the duplicate prescription. What to do at [address] [place] [address] Ave your prehospital diet. If you have excessive nausea or vomitting call your doctor's office     Wear portable sequential compressive devices at least 18 hours per day for 2 weeks. They may be used beyond 2 weeks as needed to help control swelling. KAILEY hose should be worn during the day - may be removed for sleep. Should be worn on both legs for 2 weeks and then on the operative extremity for a total of 6 weeks. Begin In-Home Physical Therapy; 3 times a week to work on gait training, range of motion, strengthening, and weight bearing exercises as tolerable. Continue to use your walker or cane when walking. May progress from the walker to a cane to complete total bearing as tolerable. Keep incision DRY. You may need to sponge bathe to keep the incision dry.     When to Call    - Call if you have a temperature greater then 101  - Unable to keep food down  - Are unable to bear any wieght   - Need a pain medication refill     Information obtained by :  I understand that if any problems occur once I am at home I am to contact my physician. I understand and acknowledge receipt of the instructions indicated above.                                                                                                                                            Physician's or R.N.'s Signature                                                                  Date/Time                                                                                                                                              Patient or Representative Signature                                                          Date/Time

## 2022-04-21 NOTE — CONSULTS
[address] [facility]    Name:  Chely Krishnan  MR#:   [id number]  :  1965  ACCOUNT #:  [de-identified]  DATE OF SERVICE:  2022    PSYCHIATRIC CONSULT NOTE    REFERRING PHYSICIAN:  Jason Arauz MD  CONSULTING PHYSICIAN:  Jackie Hensley MD    REASON FOR CONSULTATION:  Suicidal ideation. HISTORY OF PRESENT ILLNESS:  The patient is a 58-year-old single male with a history of mood disorder, depression, anxiety disorder, cocaine use disorder in remission, who was admitted to [facility] for left total knee arthroplasty and had surgery yesterday. Apparently during the nursing assessment, the patient reported having suicidal ideations without any plan and also was scored high on [address] [facility] [address], so Psychiatry was consulted. The patient was seen in his room lying in the bed in no acute distress. He is awake, alert and oriented and participated in the conversation well. He endorsed feeling depressed mood and having passive suicidal thoughts for a few days, but he is not taking his psychiatric medications due to concern of interaction with pain medications that he would get after the surgery, that he said the psychiatrist told him to do that, but he also said that he was on asenapine patch that his psychiatrist, Dr. Griffin Anderson is planning to decrease the dose and also add another mood stabilizer which can help with weight loss as well and that patient is looking forward to talk to Dr. Griffin Anderson after he gets out of the hospital today to restart on these medications. He also said he is on Valium three times a day for anxiety, probably that was the one concern of interaction with the opioids, and may have been stopped or decreased as the patient did not give specific details. He currently denies any suicidal ideations, denies any plans to harm himself. He reports his sleep, appetite, energy level have been fine.   He endorses weight gain even though he stopped some of the medications. The patient said in the past some psychiatric medications that he took caused him a lot of weight gain, so he is very cautious about medications that causes weight gain, but he expressed happiness that knee surgery is done, that he is able to move as much as he can and also worked with physical therapy this morning. The patient denies any homicidal thoughts. He denies having any hallucinations, but he said sometimes he talks to himself. Several times during the meeting, the patient denied having any suicidal thoughts. Chart review indicated the patient has a history of a mood disorder, depression and anxiety disorder, cocaine use disorder for several years and currently is under treatment with Dr. Teresa Kimball:  As described above, the patient has a prior diagnosis of a mood disorder and anxiety disorder. He was seen at the [facility] outpatient services for several years until 2019, and on several medications. Dr. Carlos Canela at the [place] [address] saw him for psychiatric evaluation in 2016. At that time, he concluded the patient has unipolar major depressive disorder and cocaine use disorder, but also the patient later attended [facility] program in 2020 as well. He has one history of overdose more than 10 years ago and denies any psychiatric hospitalizations. The patient is reporting has a good rapport with Dr. Bates, his psychiatrist in the community and looking forward to working with him. He is currently may be on asenapine and Valium. PAST MEDICAL HISTORY:  1.  Status post left knee arthroplasty. 2.  HIV positive. 3.  Type II diabetes mellitus. 4.  Hypertension. 5.  Hypertriglyceridemia. ALLERGIES:  TO PENICILLIN. SUBSTANCE ABUSE HISTORY:  The patient is somewhat vague on substance use history. He did report in the past he had used drugs, but for the last 2 years, he is not using any and that he quit cold turkey.   He admits to having a lot of problems with drugs in the past and he does not want to use it anymore. SOCIAL HISTORY:  The patient was born in [place] and moved to [place] when he was about 2years old. He reported his father at that time moved to [place] to work at a [employer]. The patient has graduated high school and has some college education in [place] [facility] and [address] [place] [address], but he said he has not worked in that much. He reports currently he is on disability for his knee problems and also for mental health problems. He was never  and has no children. He was living with his mother until she passed away a year ago, and said one of his sisters with her  moved in to live with him and finds good support from the family. FAMILY HISTORY:  Family history of psychiatric illness, none reported. MENTAL STATUS EXAMINATION:    Appearance, the patient is an obese middle-aged male, appears stated age. He presents well oriented and okay hygiene. He is cooperative, maintains good eye contact and no psychomotor agitation or retardation noted. Speech:  Spontaneous with normal tone and volume. Mood is expressed in \"I'm doing okay. \"  Affect is full reactive and mood congruent. Thought processes are linear and goal directed. Thought content with no suicidal or homicidal ideations. No paranoia. Perceptual disturbances, no hallucinations or delusions. Insight is fair. Judgment is fair. Impulse control is fair. Memory is intact. DIAGNOSES:  1.  Major depressive disorder, recurrent to moderate. 2.  Anxiety disorder. 3.  Cocaine use disorder in remission per history and per patient. RECOMMENDATIONS:  1.   The patient does not require inpatient psychiatric hospitalization and can be discharged when he is medically stable and appropriate by primary team.  2.  Patient to continue on his psychiatric medications including asenapine, by working with Dr. Lyle Dickey who is his outpatient psychiatrist and no medication changes are required at this time. 3.  Patient was counseled on his mood symptoms and encouraged to work with his outpatient psychiatric services for which he is agreeable. 4.  Psychiatrist is on-call, is available for any additional questions. Please call if needed, will sign off. Thanks for the consult.       Trudell Ahumada, MD      [id number]  D:  04/21/2022 13:52  T:  04/21/2022 16:48  JOB #:  [id number]

## 2022-12-01 ENCOUNTER — TRANSCRIBE ORDER (OUTPATIENT)
Dept: REGISTRATION | Age: 57
End: 2022-12-01

## 2022-12-01 ENCOUNTER — HOSPITAL ENCOUNTER (OUTPATIENT)
Dept: LAB | Age: 57
Discharge: HOME | End: 2022-12-01

## 2022-12-01 ENCOUNTER — HOSPITAL ENCOUNTER (OUTPATIENT)
Dept: PREADMISSION TESTING | Age: 57
End: 2022-12-01
Payer: MEDICARE

## 2022-12-01 DIAGNOSIS — T84.023A: ICD-10-CM

## 2022-12-01 DIAGNOSIS — T84.023A: Primary | ICD-10-CM

## 2022-12-01 LAB
ABO + RH BLD: NORMAL
BLOOD GROUP ANTIBODIES SERPL: NORMAL
SPECIMEN EXP DATE BLD: NORMAL
XX-LABCORP SPECIMEN COL,LCBCF: NORMAL

## 2022-12-01 PROCEDURE — 86900 BLOOD TYPING SEROLOGIC ABO: CPT

## 2022-12-01 PROCEDURE — 99001 SPECIMEN HANDLING PT-LAB: CPT

## 2022-12-01 PROCEDURE — 93005 ELECTROCARDIOGRAM TRACING: CPT

## 2022-12-03 LAB
ATRIAL RATE: 68 BPM
CALCULATED P AXIS, ECG09: 59 DEGREES
CALCULATED R AXIS, ECG10: -12 DEGREES
CALCULATED T AXIS, ECG11: -6 DEGREES
DIAGNOSIS, 93000: NORMAL
P-R INTERVAL, ECG05: 176 MS
Q-T INTERVAL, ECG07: 400 MS
QRS DURATION, ECG06: 78 MS
QTC CALCULATION (BEZET), ECG08: 425 MS
VENTRICULAR RATE, ECG03: 68 BPM

## 2022-12-06 ENCOUNTER — HOSPITAL ENCOUNTER (OUTPATIENT)
Dept: PREADMISSION TESTING | Age: 57
Discharge: HOME | End: 2022-12-06

## 2022-12-06 VITALS — WEIGHT: 240 LBS | BODY MASS INDEX: 37.67 KG/M2 | HEIGHT: 67 IN

## 2022-12-06 RX ORDER — SODIUM CHLORIDE, SODIUM LACTATE, POTASSIUM CHLORIDE, CALCIUM CHLORIDE 600; 310; 30; 20 MG/100ML; MG/100ML; MG/100ML; MG/100ML
125 INJECTION, SOLUTION INTRAVENOUS CONTINUOUS
Status: CANCELLED | OUTPATIENT
Start: 2022-12-06

## 2022-12-06 RX ORDER — ESZOPICLONE 1 MG/1
1 TABLET, FILM COATED ORAL
COMMUNITY

## 2022-12-06 RX ORDER — DULAGLUTIDE 1.5 MG/.5ML
1.5 INJECTION, SOLUTION SUBCUTANEOUS
COMMUNITY

## 2022-12-06 RX ORDER — GLIPIZIDE 5 MG/1
5 TABLET ORAL DAILY
COMMUNITY

## 2022-12-06 NOTE — PERIOP NOTES
No sleep apnea, removable prosthetic devices or family history of malignant hyperthermia. CHG wash x 3 days instructions reviewed. PCP is aware of the surgery. No participation in clinical trial or research study. Do not bring any valuables on DOS- jewelry, wallet, cash, laptop, medications. Does meet criteria for special population-Nathalie in posting notified. Possible time delay day of surgery reviewed. Covid [id number] bring dos. DNR status-none. Patient reported allergy to PCN, noted ancef ordered for pre-op antibiotics. Left message [facility] for verification. Received Labs from [facility]-due to specimen integrity-glucose and potassium was not completed. Patient coming on 12/7/22 for repeat Potassium and Glucose.

## 2022-12-08 ENCOUNTER — HOSPITAL ENCOUNTER (OUTPATIENT)
Dept: LAB | Age: 57
Discharge: HOME | End: 2022-12-08

## 2022-12-08 LAB — XX-LABCORP SPECIMEN COL,LCBCF: NORMAL

## 2022-12-08 PROCEDURE — 99001 SPECIMEN HANDLING PT-LAB: CPT

## 2022-12-13 ENCOUNTER — ANESTHESIA EVENT (OUTPATIENT)
Dept: SURGERY | Age: 57
DRG: 468 | End: 2022-12-13
Payer: MEDICARE

## 2022-12-13 RX ORDER — DEXTROSE MONOHYDRATE 100 MG/ML
0-250 INJECTION, SOLUTION INTRAVENOUS AS NEEDED
Status: CANCELLED | OUTPATIENT
Start: 2022-12-13

## 2022-12-13 RX ORDER — IBUPROFEN 200 MG
4 TABLET ORAL AS NEEDED
Status: CANCELLED | OUTPATIENT
Start: 2022-12-13

## 2022-12-14 ENCOUNTER — HOSPITAL ENCOUNTER (INPATIENT)
Age: 57
LOS: 1 days | Discharge: HOME HEALTH | DRG: 468 | End: 2022-12-15
Attending: ORTHOPAEDIC SURGERY | Admitting: ORTHOPAEDIC SURGERY
Payer: MEDICARE

## 2022-12-14 ENCOUNTER — ANESTHESIA (OUTPATIENT)
Dept: SURGERY | Age: 57
DRG: 468 | End: 2022-12-14
Payer: MEDICARE

## 2022-12-14 ENCOUNTER — APPOINTMENT (OUTPATIENT)
Dept: GENERAL RADIOLOGY | Age: 57
DRG: 468 | End: 2022-12-14
Attending: PHYSICIAN ASSISTANT
Payer: MEDICARE

## 2022-12-14 PROBLEM — Z96.652 STATUS POST REVISION OF TOTAL KNEE REPLACEMENT, LEFT: Status: ACTIVE | Noted: 2022-12-14

## 2022-12-14 LAB
GLUCOSE BLD STRIP.AUTO-MCNC: 108 MG/DL (ref 70–110)
GLUCOSE BLD STRIP.AUTO-MCNC: 61 MG/DL (ref 70–110)
GLUCOSE BLD STRIP.AUTO-MCNC: 79 MG/DL (ref 70–110)
GLUCOSE BLD STRIP.AUTO-MCNC: 82 MG/DL (ref 70–110)
POTASSIUM SERPL-SCNC: 4 MMOL/L (ref 3.5–5.5)

## 2022-12-14 PROCEDURE — 77030011628: Performed by: ORTHOPAEDIC SURGERY

## 2022-12-14 PROCEDURE — 77030013708 HC HNDPC SUC IRR PULS STRY –B: Performed by: ORTHOPAEDIC SURGERY

## 2022-12-14 PROCEDURE — 74011250636 HC RX REV CODE- 250/636: Performed by: ANESTHESIOLOGY

## 2022-12-14 PROCEDURE — 97535 SELF CARE MNGMENT TRAINING: CPT

## 2022-12-14 PROCEDURE — 64447 NJX AA&/STRD FEMORAL NRV IMG: CPT | Performed by: ANESTHESIOLOGY

## 2022-12-14 PROCEDURE — 84132 ASSAY OF SERUM POTASSIUM: CPT

## 2022-12-14 PROCEDURE — 77030016060 HC NDL NRV BLK TELE -A: Performed by: ANESTHESIOLOGY

## 2022-12-14 PROCEDURE — 77030011256 HC DRSG MEPILEX <16IN NO BORD MOLN -A: Performed by: ORTHOPAEDIC SURGERY

## 2022-12-14 PROCEDURE — 74011000250 HC RX REV CODE- 250: Performed by: NURSE ANESTHETIST, CERTIFIED REGISTERED

## 2022-12-14 PROCEDURE — 97116 GAIT TRAINING THERAPY: CPT

## 2022-12-14 PROCEDURE — 36415 COLL VENOUS BLD VENIPUNCTURE: CPT

## 2022-12-14 PROCEDURE — 77030014144 HC TY SPN ANES BBMI -B: Performed by: ANESTHESIOLOGY

## 2022-12-14 PROCEDURE — 87075 CULTR BACTERIA EXCEPT BLOOD: CPT

## 2022-12-14 PROCEDURE — 74011250636 HC RX REV CODE- 250/636: Performed by: PHYSICIAN ASSISTANT

## 2022-12-14 PROCEDURE — 77030006835 HC BLD SAW SAG STRY -B: Performed by: ORTHOPAEDIC SURGERY

## 2022-12-14 PROCEDURE — 97165 OT EVAL LOW COMPLEX 30 MIN: CPT

## 2022-12-14 PROCEDURE — 76210000006 HC OR PH I REC 0.5 TO 1 HR: Performed by: ORTHOPAEDIC SURGERY

## 2022-12-14 PROCEDURE — 87389 HIV-1 AG W/HIV-1&-2 AB AG IA: CPT

## 2022-12-14 PROCEDURE — 74011000250 HC RX REV CODE- 250: Performed by: ORTHOPAEDIC SURGERY

## 2022-12-14 PROCEDURE — 74011000250 HC RX REV CODE- 250: Performed by: PHYSICIAN ASSISTANT

## 2022-12-14 PROCEDURE — 2709999900 HC NON-CHARGEABLE SUPPLY: Performed by: ORTHOPAEDIC SURGERY

## 2022-12-14 PROCEDURE — 77030008462 HC STPLR SKN PROX J&J -A: Performed by: ORTHOPAEDIC SURGERY

## 2022-12-14 PROCEDURE — 74011250636 HC RX REV CODE- 250/636: Performed by: NURSE ANESTHETIST, CERTIFIED REGISTERED

## 2022-12-14 PROCEDURE — 77030020782 HC GWN BAIR PAWS FLX 3M -B: Performed by: ORTHOPAEDIC SURGERY

## 2022-12-14 PROCEDURE — 77030027138 HC INCENT SPIROMETER -A: Performed by: ORTHOPAEDIC SURGERY

## 2022-12-14 PROCEDURE — 76010000153 HC OR TIME 1.5 TO 2 HR: Performed by: ORTHOPAEDIC SURGERY

## 2022-12-14 PROCEDURE — 73560 X-RAY EXAM OF KNEE 1 OR 2: CPT

## 2022-12-14 PROCEDURE — 82962 GLUCOSE BLOOD TEST: CPT

## 2022-12-14 PROCEDURE — 87116 MYCOBACTERIA CULTURE: CPT

## 2022-12-14 PROCEDURE — 77030004402 HC BUR NEUR STRY -C: Performed by: ORTHOPAEDIC SURGERY

## 2022-12-14 PROCEDURE — 77030040361 HC SLV COMPR DVT MDII -B: Performed by: ORTHOPAEDIC SURGERY

## 2022-12-14 PROCEDURE — 76060000034 HC ANESTHESIA 1.5 TO 2 HR: Performed by: ORTHOPAEDIC SURGERY

## 2022-12-14 PROCEDURE — 77030000032 HC CUF TRNQT ZIMM -B: Performed by: ORTHOPAEDIC SURGERY

## 2022-12-14 PROCEDURE — 87340 HEPATITIS B SURFACE AG IA: CPT

## 2022-12-14 PROCEDURE — C9290 INJ, BUPIVACAINE LIPOSOME: HCPCS | Performed by: ORTHOPAEDIC SURGERY

## 2022-12-14 PROCEDURE — 64447 NJX AA&/STRD FEMORAL NRV IMG: CPT | Performed by: ORTHOPAEDIC SURGERY

## 2022-12-14 PROCEDURE — 74011000258 HC RX REV CODE- 258: Performed by: ORTHOPAEDIC SURGERY

## 2022-12-14 PROCEDURE — 74011250637 HC RX REV CODE- 250/637: Performed by: ANESTHESIOLOGY

## 2022-12-14 PROCEDURE — 77030011265 HC ELECTRD BLD HEX COVD -A: Performed by: ORTHOPAEDIC SURGERY

## 2022-12-14 PROCEDURE — C1776 JOINT DEVICE (IMPLANTABLE): HCPCS | Performed by: ORTHOPAEDIC SURGERY

## 2022-12-14 PROCEDURE — 77030033067 HC SUT PDO STRATFX SPIR J&J -B: Performed by: ORTHOPAEDIC SURGERY

## 2022-12-14 PROCEDURE — 74011250636 HC RX REV CODE- 250/636: Performed by: ORTHOPAEDIC SURGERY

## 2022-12-14 PROCEDURE — 87521 HEPATITIS C PROBE&RVRS TRNSC: CPT

## 2022-12-14 PROCEDURE — 76942 ECHO GUIDE FOR BIOPSY: CPT | Performed by: ORTHOPAEDIC SURGERY

## 2022-12-14 PROCEDURE — 86701 HIV-1ANTIBODY: CPT

## 2022-12-14 PROCEDURE — 88305 TISSUE EXAM BY PATHOLOGIST: CPT

## 2022-12-14 PROCEDURE — 74011250637 HC RX REV CODE- 250/637: Performed by: PHYSICIAN ASSISTANT

## 2022-12-14 PROCEDURE — 74011000250 HC RX REV CODE- 250: Performed by: ANESTHESIOLOGY

## 2022-12-14 PROCEDURE — 97161 PT EVAL LOW COMPLEX 20 MIN: CPT

## 2022-12-14 PROCEDURE — 65270000029 HC RM PRIVATE

## 2022-12-14 PROCEDURE — 87205 SMEAR GRAM STAIN: CPT

## 2022-12-14 PROCEDURE — 77030040815: Performed by: ORTHOPAEDIC SURGERY

## 2022-12-14 DEVICE — JOURNEY II BCS CONSTRAINED                                    ARTICULAR INSERT SIZE 5-6 LEFT 13MM
Type: IMPLANTABLE DEVICE | Site: KNEE | Status: FUNCTIONAL
Brand: JOURNEY

## 2022-12-14 RX ORDER — SODIUM CHLORIDE 9 MG/ML
125 INJECTION, SOLUTION INTRAVENOUS CONTINUOUS
Status: DISCONTINUED | OUTPATIENT
Start: 2022-12-14 | End: 2022-12-15 | Stop reason: HOSPADM

## 2022-12-14 RX ORDER — GLIPIZIDE 5 MG/1
5 TABLET ORAL DAILY
Status: DISCONTINUED | OUTPATIENT
Start: 2022-12-15 | End: 2022-12-15 | Stop reason: HOSPADM

## 2022-12-14 RX ORDER — CEFAZOLIN SODIUM/WATER 2 G/20 ML
2 SYRINGE (ML) INTRAVENOUS
Status: COMPLETED | OUTPATIENT
Start: 2022-12-14 | End: 2022-12-14

## 2022-12-14 RX ORDER — DEXAMETHASONE SODIUM PHOSPHATE 10 MG/ML
INJECTION INTRAMUSCULAR; INTRAVENOUS
Status: COMPLETED | OUTPATIENT
Start: 2022-12-14 | End: 2022-12-14

## 2022-12-14 RX ORDER — HYDROMORPHONE HYDROCHLORIDE 1 MG/ML
0.5 INJECTION, SOLUTION INTRAMUSCULAR; INTRAVENOUS; SUBCUTANEOUS
Status: DISCONTINUED | OUTPATIENT
Start: 2022-12-14 | End: 2022-12-14 | Stop reason: HOSPADM

## 2022-12-14 RX ORDER — LIDOCAINE HYDROCHLORIDE 20 MG/ML
INJECTION, SOLUTION EPIDURAL; INFILTRATION; INTRACAUDAL; PERINEURAL AS NEEDED
Status: DISCONTINUED | OUTPATIENT
Start: 2022-12-14 | End: 2022-12-14 | Stop reason: HOSPADM

## 2022-12-14 RX ORDER — EPHEDRINE SULFATE/0.9% NACL/PF 50 MG/5 ML
SYRINGE (ML) INTRAVENOUS AS NEEDED
Status: DISCONTINUED | OUTPATIENT
Start: 2022-12-14 | End: 2022-12-14 | Stop reason: HOSPADM

## 2022-12-14 RX ORDER — DIPHENHYDRAMINE HCL 25 MG
25 CAPSULE ORAL
Status: DISCONTINUED | OUTPATIENT
Start: 2022-12-14 | End: 2022-12-15 | Stop reason: HOSPADM

## 2022-12-14 RX ORDER — OXYCODONE HYDROCHLORIDE 5 MG/1
10 TABLET ORAL
Status: DISCONTINUED | OUTPATIENT
Start: 2022-12-14 | End: 2022-12-15 | Stop reason: HOSPADM

## 2022-12-14 RX ORDER — SODIUM CHLORIDE, SODIUM LACTATE, POTASSIUM CHLORIDE, CALCIUM CHLORIDE 600; 310; 30; 20 MG/100ML; MG/100ML; MG/100ML; MG/100ML
125 INJECTION, SOLUTION INTRAVENOUS CONTINUOUS
Status: DISCONTINUED | OUTPATIENT
Start: 2022-12-14 | End: 2022-12-14 | Stop reason: HOSPADM

## 2022-12-14 RX ORDER — SODIUM CHLORIDE, SODIUM LACTATE, POTASSIUM CHLORIDE, AND CALCIUM CHLORIDE .6; .31; .03; .02 G/100ML; G/100ML; G/100ML; G/100ML
IRRIGANT IRRIGATION AS NEEDED
Status: DISCONTINUED | OUTPATIENT
Start: 2022-12-14 | End: 2022-12-14 | Stop reason: HOSPADM

## 2022-12-14 RX ORDER — NALOXONE HYDROCHLORIDE 0.4 MG/ML
0.4 INJECTION, SOLUTION INTRAMUSCULAR; INTRAVENOUS; SUBCUTANEOUS AS NEEDED
Status: DISCONTINUED | OUTPATIENT
Start: 2022-12-14 | End: 2022-12-15 | Stop reason: HOSPADM

## 2022-12-14 RX ORDER — CEFAZOLIN SODIUM/WATER 2 G/20 ML
2 SYRINGE (ML) INTRAVENOUS EVERY 8 HOURS
Status: COMPLETED | OUTPATIENT
Start: 2022-12-14 | End: 2022-12-15

## 2022-12-14 RX ORDER — GLYCOPYRROLATE 0.2 MG/ML
INJECTION INTRAMUSCULAR; INTRAVENOUS AS NEEDED
Status: DISCONTINUED | OUTPATIENT
Start: 2022-12-14 | End: 2022-12-14 | Stop reason: HOSPADM

## 2022-12-14 RX ORDER — OXYCODONE HYDROCHLORIDE 5 MG/1
5 TABLET ORAL AS NEEDED
Status: DISCONTINUED | OUTPATIENT
Start: 2022-12-14 | End: 2022-12-14 | Stop reason: HOSPADM

## 2022-12-14 RX ORDER — SODIUM CHLORIDE 0.9 % (FLUSH) 0.9 %
5-40 SYRINGE (ML) INJECTION EVERY 8 HOURS
Status: DISCONTINUED | OUTPATIENT
Start: 2022-12-14 | End: 2022-12-14 | Stop reason: HOSPADM

## 2022-12-14 RX ORDER — FENTANYL CITRATE 50 UG/ML
25 INJECTION, SOLUTION INTRAMUSCULAR; INTRAVENOUS AS NEEDED
Status: DISCONTINUED | OUTPATIENT
Start: 2022-12-14 | End: 2022-12-14 | Stop reason: HOSPADM

## 2022-12-14 RX ORDER — IBUPROFEN 200 MG
16 TABLET ORAL AS NEEDED
Status: DISCONTINUED | OUTPATIENT
Start: 2022-12-14 | End: 2022-12-15 | Stop reason: HOSPADM

## 2022-12-14 RX ORDER — VANCOMYCIN/0.9 % SOD CHLORIDE 1.5G/250ML
1500 PLASTIC BAG, INJECTION (ML) INTRAVENOUS ONCE
Status: COMPLETED | OUTPATIENT
Start: 2022-12-14 | End: 2022-12-14

## 2022-12-14 RX ORDER — VANCOMYCIN/0.9 % SOD CHLORIDE 1.5G/250ML
1500 PLASTIC BAG, INJECTION (ML) INTRAVENOUS ONCE
Status: COMPLETED | OUTPATIENT
Start: 2022-12-15 | End: 2022-12-15

## 2022-12-14 RX ORDER — SODIUM CHLORIDE, SODIUM LACTATE, POTASSIUM CHLORIDE, CALCIUM CHLORIDE 600; 310; 30; 20 MG/100ML; MG/100ML; MG/100ML; MG/100ML
125 INJECTION, SOLUTION INTRAVENOUS CONTINUOUS
Status: DISCONTINUED | OUTPATIENT
Start: 2022-12-14 | End: 2022-12-14

## 2022-12-14 RX ORDER — AMOXICILLIN 250 MG
1 CAPSULE ORAL 2 TIMES DAILY
Status: DISCONTINUED | OUTPATIENT
Start: 2022-12-14 | End: 2022-12-15 | Stop reason: HOSPADM

## 2022-12-14 RX ORDER — DEXTROSE MONOHYDRATE 100 MG/ML
0-250 INJECTION, SOLUTION INTRAVENOUS AS NEEDED
Status: DISCONTINUED | OUTPATIENT
Start: 2022-12-14 | End: 2022-12-15 | Stop reason: HOSPADM

## 2022-12-14 RX ORDER — ONDANSETRON 2 MG/ML
4 INJECTION INTRAMUSCULAR; INTRAVENOUS
Status: DISCONTINUED | OUTPATIENT
Start: 2022-12-14 | End: 2022-12-15 | Stop reason: HOSPADM

## 2022-12-14 RX ORDER — ROPIVACAINE HYDROCHLORIDE 2 MG/ML
INJECTION, SOLUTION EPIDURAL; INFILTRATION; PERINEURAL
Status: COMPLETED | OUTPATIENT
Start: 2022-12-14 | End: 2022-12-14

## 2022-12-14 RX ORDER — INSULIN LISPRO 100 [IU]/ML
INJECTION, SOLUTION INTRAVENOUS; SUBCUTANEOUS
Status: DISCONTINUED | OUTPATIENT
Start: 2022-12-14 | End: 2022-12-15 | Stop reason: HOSPADM

## 2022-12-14 RX ORDER — ATORVASTATIN CALCIUM 10 MG/1
10 TABLET, FILM COATED ORAL DAILY
Status: DISCONTINUED | OUTPATIENT
Start: 2022-12-15 | End: 2022-12-15 | Stop reason: HOSPADM

## 2022-12-14 RX ORDER — HYDROMORPHONE HYDROCHLORIDE 1 MG/ML
1 INJECTION, SOLUTION INTRAMUSCULAR; INTRAVENOUS; SUBCUTANEOUS
Status: DISCONTINUED | OUTPATIENT
Start: 2022-12-14 | End: 2022-12-15 | Stop reason: HOSPADM

## 2022-12-14 RX ORDER — PROPOFOL 10 MG/ML
INJECTION, EMULSION INTRAVENOUS
Status: DISCONTINUED | OUTPATIENT
Start: 2022-12-14 | End: 2022-12-14 | Stop reason: HOSPADM

## 2022-12-14 RX ORDER — AMLODIPINE BESYLATE 5 MG/1
5 TABLET ORAL DAILY
Status: DISCONTINUED | OUTPATIENT
Start: 2022-12-15 | End: 2022-12-15 | Stop reason: HOSPADM

## 2022-12-14 RX ORDER — FLUMAZENIL 0.1 MG/ML
INJECTION INTRAVENOUS AS NEEDED
Status: DISCONTINUED | OUTPATIENT
Start: 2022-12-14 | End: 2022-12-14 | Stop reason: HOSPADM

## 2022-12-14 RX ORDER — SODIUM CHLORIDE 0.9 % (FLUSH) 0.9 %
5-40 SYRINGE (ML) INJECTION EVERY 8 HOURS
Status: DISCONTINUED | OUTPATIENT
Start: 2022-12-14 | End: 2022-12-15 | Stop reason: HOSPADM

## 2022-12-14 RX ORDER — ACETAMINOPHEN 500 MG
1000 TABLET ORAL
Status: COMPLETED | OUTPATIENT
Start: 2022-12-14 | End: 2022-12-14

## 2022-12-14 RX ORDER — OXYCODONE HYDROCHLORIDE 5 MG/1
5 TABLET ORAL
Status: DISCONTINUED | OUTPATIENT
Start: 2022-12-14 | End: 2022-12-15 | Stop reason: HOSPADM

## 2022-12-14 RX ORDER — BUPIVACAINE HYDROCHLORIDE 2.5 MG/ML
INJECTION, SOLUTION EPIDURAL; INFILTRATION; INTRACAUDAL AS NEEDED
Status: DISCONTINUED | OUTPATIENT
Start: 2022-12-14 | End: 2022-12-14 | Stop reason: HOSPADM

## 2022-12-14 RX ORDER — INSULIN LISPRO 100 [IU]/ML
INJECTION, SOLUTION INTRAVENOUS; SUBCUTANEOUS ONCE
Status: DISCONTINUED | OUTPATIENT
Start: 2022-12-14 | End: 2022-12-14 | Stop reason: HOSPADM

## 2022-12-14 RX ORDER — MIDAZOLAM HYDROCHLORIDE 1 MG/ML
INJECTION, SOLUTION INTRAMUSCULAR; INTRAVENOUS AS NEEDED
Status: DISCONTINUED | OUTPATIENT
Start: 2022-12-14 | End: 2022-12-14 | Stop reason: HOSPADM

## 2022-12-14 RX ORDER — TRANEXAMIC ACID 10 MG/ML
1 INJECTION, SOLUTION INTRAVENOUS
Status: COMPLETED | OUTPATIENT
Start: 2022-12-14 | End: 2022-12-14

## 2022-12-14 RX ORDER — LANOLIN ALCOHOL/MO/W.PET/CERES
1 CREAM (GRAM) TOPICAL
Status: DISCONTINUED | OUTPATIENT
Start: 2022-12-15 | End: 2022-12-15 | Stop reason: HOSPADM

## 2022-12-14 RX ORDER — SODIUM CHLORIDE 0.9 % (FLUSH) 0.9 %
5-40 SYRINGE (ML) INJECTION AS NEEDED
Status: DISCONTINUED | OUTPATIENT
Start: 2022-12-14 | End: 2022-12-14 | Stop reason: HOSPADM

## 2022-12-14 RX ORDER — IBUPROFEN 200 MG
4 TABLET ORAL AS NEEDED
Status: DISCONTINUED | OUTPATIENT
Start: 2022-12-14 | End: 2022-12-14 | Stop reason: HOSPADM

## 2022-12-14 RX ORDER — ASPIRIN 81 MG/1
81 TABLET ORAL 2 TIMES DAILY
Status: DISCONTINUED | OUTPATIENT
Start: 2022-12-14 | End: 2022-12-15 | Stop reason: HOSPADM

## 2022-12-14 RX ORDER — SODIUM CHLORIDE 0.9 % (FLUSH) 0.9 %
5-40 SYRINGE (ML) INJECTION AS NEEDED
Status: DISCONTINUED | OUTPATIENT
Start: 2022-12-14 | End: 2022-12-15 | Stop reason: HOSPADM

## 2022-12-14 RX ORDER — ONDANSETRON 2 MG/ML
INJECTION INTRAMUSCULAR; INTRAVENOUS AS NEEDED
Status: DISCONTINUED | OUTPATIENT
Start: 2022-12-14 | End: 2022-12-14 | Stop reason: HOSPADM

## 2022-12-14 RX ORDER — PHENYLEPHRINE HCL IN 0.9% NACL 1 MG/10 ML
SYRINGE (ML) INTRAVENOUS AS NEEDED
Status: DISCONTINUED | OUTPATIENT
Start: 2022-12-14 | End: 2022-12-14 | Stop reason: HOSPADM

## 2022-12-14 RX ORDER — LORAZEPAM 0.5 MG/1
0.5 TABLET ORAL
Status: DISCONTINUED | OUTPATIENT
Start: 2022-12-14 | End: 2022-12-15 | Stop reason: HOSPADM

## 2022-12-14 RX ORDER — ACETAMINOPHEN 500 MG
1000 TABLET ORAL EVERY 8 HOURS
Status: DISCONTINUED | OUTPATIENT
Start: 2022-12-14 | End: 2022-12-15 | Stop reason: HOSPADM

## 2022-12-14 RX ORDER — DEXTROSE MONOHYDRATE 100 MG/ML
0-250 INJECTION, SOLUTION INTRAVENOUS AS NEEDED
Status: DISCONTINUED | OUTPATIENT
Start: 2022-12-14 | End: 2022-12-14 | Stop reason: HOSPADM

## 2022-12-14 RX ORDER — DEXMEDETOMIDINE HYDROCHLORIDE 100 UG/ML
INJECTION, SOLUTION INTRAVENOUS
Status: COMPLETED | OUTPATIENT
Start: 2022-12-14 | End: 2022-12-14

## 2022-12-14 RX ADMIN — DEXMEDETOMIDINE HYDROCHLORIDE 15 MCG: 100 INJECTION, SOLUTION INTRAVENOUS at 13:16

## 2022-12-14 RX ADMIN — Medication 200 MCG: at 14:57

## 2022-12-14 RX ADMIN — Medication 1500 MG: at 13:34

## 2022-12-14 RX ADMIN — Medication 100 MCG: at 14:07

## 2022-12-14 RX ADMIN — GLYCOPYRROLATE 0.1 MG: 0.2 INJECTION INTRAMUSCULAR; INTRAVENOUS at 14:20

## 2022-12-14 RX ADMIN — ROPIVACAINE HYDROCHLORIDE 20 ML: 2 INJECTION EPIDURAL; INFILTRATION; PERINEURAL at 13:16

## 2022-12-14 RX ADMIN — OXYCODONE 10 MG: 5 TABLET ORAL at 18:03

## 2022-12-14 RX ADMIN — ACETAMINOPHEN 1000 MG: 500 TABLET ORAL at 21:37

## 2022-12-14 RX ADMIN — TRANEXAMIC ACID 1 G: 10 INJECTION, SOLUTION INTRAVENOUS at 13:55

## 2022-12-14 RX ADMIN — TRANEXAMIC ACID 1 G: 10 INJECTION, SOLUTION INTRAVENOUS at 14:40

## 2022-12-14 RX ADMIN — DOCUSATE SODIUM 50 MG AND SENNOSIDES 8.6 MG 1 TABLET: 8.6; 5 TABLET, FILM COATED ORAL at 21:36

## 2022-12-14 RX ADMIN — ACETAMINOPHEN 1000 MG: 500 TABLET ORAL at 12:54

## 2022-12-14 RX ADMIN — Medication 2 G: at 13:34

## 2022-12-14 RX ADMIN — OXYCODONE 5 MG: 5 TABLET ORAL at 23:19

## 2022-12-14 RX ADMIN — Medication 100 MCG: at 14:22

## 2022-12-14 RX ADMIN — PROPOFOL 100 MCG/KG/MIN: 10 INJECTION, EMULSION INTRAVENOUS at 13:49

## 2022-12-14 RX ADMIN — SODIUM CHLORIDE, SODIUM LACTATE, POTASSIUM CHLORIDE, AND CALCIUM CHLORIDE 125 ML/HR: 600; 310; 30; 20 INJECTION, SOLUTION INTRAVENOUS at 12:45

## 2022-12-14 RX ADMIN — DEXAMETHASONE SODIUM PHOSPHATE 4 MG: 10 INJECTION, SOLUTION INTRAMUSCULAR; INTRAVENOUS at 13:16

## 2022-12-14 RX ADMIN — MEPIVACAINE HYDROCHLORIDE 45 MG: 15 INJECTION, SOLUTION EPIDURAL; INFILTRATION at 13:44

## 2022-12-14 RX ADMIN — SODIUM CHLORIDE 125 ML/HR: 9 INJECTION, SOLUTION INTRAVENOUS at 18:05

## 2022-12-14 RX ADMIN — ONDANSETRON HYDROCHLORIDE 4 MG: 2 INJECTION INTRAMUSCULAR; INTRAVENOUS at 14:19

## 2022-12-14 RX ADMIN — MIDAZOLAM HYDROCHLORIDE 5 MG: 1 INJECTION, SOLUTION INTRAMUSCULAR; INTRAVENOUS at 13:10

## 2022-12-14 RX ADMIN — FENTANYL CITRATE 25 MCG: 50 INJECTION, SOLUTION INTRAMUSCULAR; INTRAVENOUS at 15:42

## 2022-12-14 RX ADMIN — ASPIRIN 81 MG: 81 TABLET, COATED ORAL at 21:37

## 2022-12-14 RX ADMIN — Medication 10 MG: at 14:33

## 2022-12-14 RX ADMIN — SODIUM CHLORIDE, SODIUM LACTATE, POTASSIUM CHLORIDE, AND CALCIUM CHLORIDE: 600; 310; 30; 20 INJECTION, SOLUTION INTRAVENOUS at 15:02

## 2022-12-14 RX ADMIN — FENTANYL CITRATE 25 MCG: 50 INJECTION, SOLUTION INTRAMUSCULAR; INTRAVENOUS at 15:54

## 2022-12-14 RX ADMIN — FLUMAZENIL 0.1 MG: 0.1 INJECTION, SOLUTION INTRAVENOUS at 15:08

## 2022-12-14 RX ADMIN — Medication 100 MCG: at 14:16

## 2022-12-14 RX ADMIN — CEFAZOLIN 2 G: 10 INJECTION, POWDER, FOR SOLUTION INTRAVENOUS at 21:37

## 2022-12-14 RX ADMIN — Medication 100 MCG: at 14:00

## 2022-12-14 RX ADMIN — ACETAMINOPHEN 1000 MG: 500 TABLET ORAL at 18:03

## 2022-12-14 RX ADMIN — Medication 100 MCG: at 14:02

## 2022-12-14 RX ADMIN — Medication 10 MG: at 14:26

## 2022-12-14 RX ADMIN — LIDOCAINE HYDROCHLORIDE 60 MG: 20 INJECTION, SOLUTION EPIDURAL; INFILTRATION; INTRACAUDAL; PERINEURAL at 13:49

## 2022-12-14 NOTE — INTERVAL H&P NOTE
Update History & Physical    The Patient's History and Physical was reviewed with the patient and I examined the patient. There was no change. The surgical site was confirmed by the patient and me. Plan:  The risk, benefits, expected outcome, and alternative to the recommended procedure have been discussed with the patient. Patient understands and wants to proceed with the procedure.     Electronically signed by El Mosqueda MD on 12/14/2022 at 12:10 PM

## 2022-12-14 NOTE — PERIOP NOTES
TRANSFER - IN REPORT:    Verbal report received from CRNA and OR nurse(name) on Mirella Barry  being received from OR(unit) for routine post - op      Report consisted of patients Situation, Background, Assessment and   Recommendations(SBAR). Information from the following report(s) SBAR, Kardex, OR Summary, Procedure Summary, Intake/Output, and MAR was reviewed with the receiving nurse. Opportunity for questions and clarification was provided. Assessment completed upon patients arrival to unit and care assumed.

## 2022-12-14 NOTE — ANESTHESIA POSTPROCEDURE EVALUATION
Post-Anesthesia Evaluation and Assessment    Cardiovascular Function/Vital Signs  Visit Vitals  BP 99/60   Pulse 65   Temp 37.2 °C (98.9 °F)   Resp 11   Ht 5' 7\" (1.702 m)   Wt 107.2 kg (236 lb 6.4 oz)   SpO2 97%   BMI 37.03 kg/m²       Patient is status post Procedure(s):  REVISION LEFT TOTAL KNEE ARTHROPLASTY AND ALL INDICATED PROCEDURES WITH SYNEVECTOMY AND LINER EXCHANGE ([address]). Nausea/Vomiting: Controlled. Postoperative hydration reviewed and adequate. Pain:  Pain Scale 1: Numeric (0 - 10) (12/14/22 1554)  Pain Intensity 1: 4 (12/14/22 1554)   Managed. Neurological Status:   Neuro (WDL): Within Defined Limits (12/14/22 1555)   At baseline. Mental Status and Level of Consciousness: Baseline and stable. Pulmonary Status:   O2 Device: Nasal cannula (12/14/22 1534)   Adequate oxygenation and airway patent. Complications related to anesthesia: None    Post-anesthesia assessment completed. No concerns. Patient has met all discharge requirements.     Signed By: Darcus Galeazzi, MD

## 2022-12-14 NOTE — PERIOP NOTES
TRANSFER - OUT REPORT:    Verbal report given to FALLON Mullins(name) on Carmelita Stein  being transferred to [address] [place] [address] (unit) for routine post - op       Report consisted of patients Situation, Background, Assessment and   Recommendations(SBAR). Information from the following report(s) SBAR, Kardex, and OR Summary was reviewed with the receiving nurse. Lines:   Peripheral IV 12/14/22 Right; Outer Hand (Active)   Site Assessment Clean, dry, & intact 12/14/22 1555   Phlebitis Assessment 0 12/14/22 1555   Infiltration Assessment 0 12/14/22 1555   Dressing Status Clean, dry, & intact 12/14/22 1555   Dressing Type Tape;Transparent 12/14/22 1555   Hub Color/Line Status Infusing;Pink;Patent 12/14/22 1555   Alcohol Cap Used No 12/14/22 1254        Opportunity for questions and clarification was provided.       Patient transported with:   Registered Nurse  Tech

## 2022-12-14 NOTE — PERIOP NOTES
Reviewed PTA medication list with patient/caregiver and patient/caregiver denies any additional medications. Patient admits to having a responsible adult care for them at home for at least 24 hours after surgery. Patient encouraged to use gown warming system and informed that using said warming gown to regulate body temperature prior to a procedure has been shown to help reduce the risks of blood clots and infection. Patient's pharmacy of choice verified and documented in PTA medication section. Dual skin assessment & fall risk band verification completed with Rochelle LEHMAN.

## 2022-12-14 NOTE — PROGRESS NOTES
Vancomycin for perioperative prophylaxis against MRSA in higher risk revision surgery  Vancomycin has no gram negative coverage. Ancef has broad spectrum gram negative coverage, therefore dual antibiotic prophylaxis with Vancomycin and Ancef is appropriate for this patient's joint replacement surgery. Hives or Rash with PCN is NOT a contraindication to use of ANCEF. Concurrent cephalosporin allergy with non-anaphylactic reaction to PCN is about 1%. Clindamycin carries increased risk of C. Diff. In patients with non-anaphylactic PCN allergy, Ancef is the antibiotic of choice for joint replacement surgery.

## 2022-12-14 NOTE — ANESTHESIA PROCEDURE NOTES
Peripheral Block    Start time: 12/14/2022 1:10 PM  End time: 12/14/2022 1:16 PM  Performed by: Joann Velázquez MD  Authorized by: Joann Velázquez MD       Pre-procedure: Indications: at surgeon's request and post-op pain management    Preanesthetic Checklist: patient identified, risks and benefits discussed, site marked, timeout performed, anesthesia consent given, patient being monitored and fire risk safety assessment completed and verbalized    Timeout Time: 13:10 EST      Block Type:   Block Type: Adductor canal block  Laterality:  Left  Monitoring:  Standard ASA monitoring, continuous pulse ox, frequent vital sign checks, heart rate, oxygen and responsive to questions  Injection Technique:  Single shot  Procedures: ultrasound guided    Procedures comment:  For needle placement  Patient Position: supine  Prep: chlorhexidine    Location:  Lower thigh  Needle Type:  Stimuplex  Needle Gauge:  21 G  Needle Localization:  Ultrasound guidance  Medication Injected:  Ropivacaine (NAROPIN) 2 mg/mL (0.2 %) injection - Peripheral Nerve Block   20 mL - 12/14/2022 1:16:00 PM  dexamethasone (PF) (DECADRON) 10 mg/mL injection - Peripheral Nerve Block   4 mg - 12/14/2022 1:16:00 PM  dexmedeTOMidine (PRECEDEX) 100 mcg/mL iv solution - Peripheral Nerve Block   15 mcg - 12/14/2022 1:16:00 PM  Med Admin Time: 12/14/2022 1:16 PM    Assessment:  Number of attempts:  1  Injection Assessment:  Incremental injection every 5 mL, negative aspiration for CSF, no paresthesia, ultrasound image on chart, local visualized surrounding nerve on ultrasound, negative aspiration for blood and no intravascular symptoms  Patient tolerance:  Patient tolerated the procedure well with no immediate complications  Patient able to straight leg raise right leg after block. No sensory or motor block.

## 2022-12-14 NOTE — PROGRESS NOTES
Problem: Self Care Deficits Care Plan (Adult)  Goal: *Acute Goals and Plan of Care (Insert Text)  Description: Initial Occupational Therapy Goals (12/14/2022) Within 7 day(s):    1. Patient will perform grooming standing sinkside with supervision for increased independence with ADLs. 2. Patient will perform LB dressing with supervision & A/E PRN for increased independence with ADLs. 3. Patient will perform toilet transfer with supervision for increased independence with ADLs. 4. Patient will perform all aspects of toileting with supervision for increased independence with ADLs. 5. Patient will independently apply energy conservation techniques with 1 verbal cue(s)for increased independence with ADLs. 6. Patient will perform bathroom mobility with supervision for increased independence/safety with ADLs. Outcome: Progressing Towards Goal  OCCUPATIONAL THERAPY EVALUATION    Patient: Praveen Brannon (14 y.o. male)  Date: 12/14/2022  Primary Diagnosis: Status post revision of total knee replacement, left [Z96.652]  Procedure(s) (LRB):  REVISION LEFT TOTAL KNEE ARTHROPLASTY AND ALL INDICATED PROCEDURES WITH SYNEVECTOMY AND LINER EXCHANGE (SPEC POP) (Left) Day of Surgery   Precautions: Fall, WBAT  PLOF: pt independent for ADLs/functional mobility    ASSESSMENT AND RECOMMENDATIONS:  Based on the objective data described below, the patient presents with LLE decreased ROM and strength affecting LE ADLs. Pt found supine in bed, vitals assessed and WNL, pt reporting pain 8/10, agreeable to therapy. Pt able to sit up to EOB with SBA/additional time. Educated pt on proper body mechanics for ADLs s/p TKR. Pt was able to thread B feet through shorts without assist long sitting on bed, CGA in standing to pull up to waist. Pt CGA for STS/bathroom mobility with vc for safe use of RW. Pt ambulated back to EOB and returned to supine with CGA, ice applied to L knee, call bell/phone within reach.  Patient will benefit from skilled Occupational Therapy intervention to maximize safety/independence with ADLs at d/c.    Education: Reviewed home safety, body mechanics, importance of moving every hour to prevent joint stiffness, role of ice for edema/pain control, Rolling Walker management/safety, and adaptive dressing techniques with patient verbalizing  understanding at this time     Patient will benefit from skilled intervention to address the above impairments. Patient's rehabilitation potential is considered to be Good  Factors which may influence rehabilitation potential include:   []             None noted  []             Mental ability/status  []             Medical condition  []             Home/family situation and support systems  []             Safety awareness  [x]             Pain tolerance/management  []             Other:        PLAN :  Recommendations and Planned Interventions:   [x]               Self Care Training                  [x]      Therapeutic Activities  [x]               Functional Mobility Training   []      Cognitive Retraining  [x]               Therapeutic Exercises           [x]      Endurance Activities  [x]               Balance Training                    []      Neuromuscular Re-Education  []               Visual/Perceptual Training     [x]      Home Safety Training  [x]               Patient Education                   [x]      Family Training/Education  []               Other (comment):    Frequency/Duration: Patient will be followed by Occupational Therapy 1-2 times per day/4-7 days per week to address goals. Discharge Recommendations: Home health   Further Equipment Recommendations for Discharge: N/A    AMPAC: Based on an AM-PAC score of 19/24 and their current ADL deficits; it is recommended that the patient have 2-3 sessions per week of Occupational Therapy at d/c to increase the patient's independence.       This AMPAC score should be considered in conjunction with interdisciplinary team recommendations to determine the most appropriate discharge setting. Patient's social support, diagnosis, medical stability, and prior level of function should also be taken into consideration. SUBJECTIVE:   Patient stated it hurts pretty bad.     OBJECTIVE DATA SUMMARY:     Past Medical History:   Diagnosis Date    Arthritis 2007    knees    Chronic pain 2007    knees    Diabetes ([facility] [address] 2013    type 2    High cholesterol 2013    HIV disease ([facility] [address] 2000    Hypertension 2010    Insomnia 2000    Psychiatric disorder 2005    bipolar disorder     Past Surgical History:   Procedure Laterality Date    HX CARPAL TUNNEL RELEASE Right 1998    HX COLONOSCOPY  11/23/2022    no polyps-every 5 years    HX KNEE ARTHROSCOPY Left 2018    x 2    HX KNEE REPLACEMENT Right 2014    partial    HX KNEE REPLACEMENT Left 04/20/2022    Total    HX TONSILLECTOMY  1984    HX WISDOM TEETH EXTRACTION  1990    x4 teeth     Barriers to Learning/Limitations: yes;  physical  Compensate with: visual, verbal, tactile, kinesthetic cues/model    Home Situation/Prior Level of Function:   Home Situation  Home Environment: Private residence  # Steps to Enter: 3  Rails to Enter: Yes  Hand Rails : Bilateral (wide)  One/Two Story Residence: One story  Living Alone: No  Support Systems: Other Family Member(s)  Patient Expects to be Discharged to[de-identified] Home with home health  Current DME Used/Available at Home: Lorren Barer, rolling, Cane, straight  Tub or Shower Type: Tub/Shower combination  []  Right hand dominant   []  Left hand dominant    Cognitive/Behavioral Status:  Neurologic State: Alert;Drowsy  Orientation Level: Oriented to person;Oriented to situation;Oriented to place  Cognition: Follows commands  Safety/Judgement: Awareness of environment    Skin: L knee incision w/ Mepilex   Edema: compression hose in place & applied ice     Coordination: BUE  Coordination: Within functional limits  Fine Motor Skills-Upper: Left Intact; Right Intact    Gross Motor Skills-Upper: Left Intact; Right Intact    Balance:  Sitting: Intact  Standing: Intact; With support    Strength: BUE  Strength: Generally decreased, functional    Tone & Sensation:BUE  Tone: Normal  Sensation: Impaired (L knee)    Range of Motion: BUE  AROM: Generally decreased, functional  PROM: Generally decreased, functional    Functional Mobility and Transfers for ADLs:  Bed Mobility:  Supine to Sit: Stand-by assistance; Additional time (vc)  Sit to Supine: Contact guard assistance; Additional time (vc)  Scooting: Stand-by assistance  Transfers:  Sit to Stand: Contact guard assistance (vc)     ADL Assessment:  Feeding: Independent  Oral Facial Hygiene/Grooming: Stand-by assistance  Bathing: Minimum assistance  Upper Body Dressing: Supervision  Lower Body Dressing: Minimum assistance  Toileting: Contact guard assistance    ADL Intervention:  Upper Body Dressing Assistance  Dressing Assistance: Supervision  Pullover Shirt: Supervision    Lower Body Dressing Assistance  Dressing Assistance: Minimum assistance  Pants With Elastic Waist: Minimum assistance  Leg Crossed Method Used: No  Position Performed: Long sitting on bed;Seated edge of bed  Cues: Verbal cues provided;Visual cues provided    Cognitive Retraining  Safety/Judgement: Awareness of environment    Pain:  Pain level pre-treatment: 8/10  Pain level post-treatment: 8/10  Pain Intervention(s): Rest, Ice, Repositioning   Response to intervention: Nurse notified, see doc flow     Activity Tolerance:   Fair. Patient able to stand ~5 minute(s). Patient able to complete ADLs with intermittent rest breaks. Patient limited by pain, strength, ROM. Patient unsteady. Please refer to the flowsheet for vital signs taken during this treatment.   After treatment:   []  Patient left in no apparent distress sitting up in chair  []  Patient sitting on EOB  [x]  Patient left in no apparent distress in bed  [x]  Call bell left within reach  [x]  Nursing notified  [] Caregiver present  [x]  Ice applied  [x]  SCD's on while back in bed  [] Bed alarm activated    COMMUNICATION/EDUCATION:   Communication/Collaboration:  [x]       Role of Occupational Therapy in the acute care setting. [x]      Home safety education was provided and the patient/caregiver indicated understanding. [x]      Patient/family have participated as able in goal setting and plan of care. [x]      Patient/family agree to work toward stated goals and plan of care. []      Patient understands intent and goals of therapy, but is neutral about his/her participation. []      Patient is unable to participate in plan of care at this time. Thank you for this referral.  [place] Qureshi OTR/L  Time Calculation: 28 mins    Eval Complexity: History: MEDIUM Complexity : Expanded review of history including physical, cognitive and psychosocial  history ; Examination: LOW Complexity : 1-3 performance deficits relating to physical, cognitive , or psychosocial skils that result in activity limitations and / or participation restrictions ; Decision Making:LOW Complexity : No comorbidities that affect functional and no verbal or physical assistance needed to complete eval tasks     [address] [place] [address] [address] [id number] AM-PAC® Daily Activity Inpatient Short Form (6-Clicks)    How much HELP from another person does the patient currently need    (If the patient hasn't done an activity recently, how much help from another person do you think he/she would need if he/she tried?)   Total (Total A or Dep)   A Lot  (Mod to Max A)   A Little (Sup or Min A)   None (Mod I to I)   Putting on and taking off regular lower body clothing? [] 1 [] 2 [x] 3 [] 4   2. Bathing (including washing, rinsing,      drying)? [] 1 [] 2 [x] 3 [] 4   3. Toileting, which includes using toilet, bedpan or urinal?   [] 1 [] 2 [x] 3 [] 4   4. Putting on and taking off regular upper body clothing? [] 1 [] 2 [x] 3 [] 4   5.  Taking care of personal grooming such as brushing teeth? [] 1 [] 2 [x] 3 [] 4   6. Eating meals? [] 1 [] 2 [] 3 [x] 4     Based on an AM-PAC score of 19/24 and their current ADL deficits; it is recommended that the patient have 2-3 sessions per week of Occupational Therapy at d/c to increase the patient's independence.

## 2022-12-14 NOTE — PROGRESS NOTES
Accessed pt chart to verify that source labs were drawn for blood exposure, labs not ordered at this time, nursing supervisor made aware

## 2022-12-14 NOTE — BRIEF OP NOTE
Brief Postoperative Note    Patient: Padmini Zamora  YOB: 1965  MRN: [id number]    Date of Procedure: 12/14/2022     Pre-Op Diagnosis: INSTABILITY OF ENTERNAL LEFT KNEE PROSTHESIS    Post-Op Diagnosis: Same as preoperative diagnosis. Procedure(s):  REVISION LEFT TOTAL KNEE ARTHROPLASTY AND ALL INDICATED PROCEDURES WITH SYNEVECTOMY AND LINER EXCHANGE ([address] POP)    Surgeon(s):  Karen Shukla MD    Surgical Assistant: Physician Assistant: Iftikhar Yanes PA-C  Surg Asst-1: Tahir Barrios    Anesthesia: Spinal + regional + local    Estimated Blood Loss (mL): 390     Complications: None    Specimens:   ID Type Source Tests Collected by Time Destination   1 : Left Knee Synovium Preservative Knee, left  Karen Shukla MD 12/14/2022 1421 Pathology   1 : Left Knee Swab #1 Joint Fluid Joint, Knee CULTURE, ANAEROBIC, CULTURE, BODY FLUID, GRAM STAIN, CULTURE & SMEAR, AFB Karen Shukla MD 12/14/2022 1303 Microbiology   2 : Left Knee Swab #2 Joint Fluid Joint, Knee CULTURE, ANAEROBIC, CULTURE, BODY FLUID, GRAM STAIN, CULTURE & SMEAR, AFB Karen Shukla MD 12/14/2022 1305 Microbiology   3 : Left Knee Swab #3 Joint Fluid Joint, Knee CULTURE, ANAEROBIC, CULTURE, BODY FLUID, GRAM STAIN, CULTURE & SMEAR, AFB Karen Shukla MD 12/14/2022 1306 Microbiology   4 : Left Knee Synovium Tissue Knee, left CULTURE, TISSUE [address] [place] Karen Shukla MD 12/14/2022 1419 Microbiology        Implants:   Implant Name Type Inv.  Item Serial No.  Lot No. LRB No. Used Action   INSERT TIB SZ 5-6 [id number] LT KNEE BI CRUC STBL [facility] - [id number]  INSERT TIB SZ 5-6 [id number] LT KNEE BI CRUC STBL [employer]_ [id number] Left 1 Implanted       Drains: * No LDAs found *    Findings: Lateral laxity, synovitis, effusion    Electronically Signed by Jh Ortiz MD on 12/14/2022 at 3:00 PM

## 2022-12-14 NOTE — DISCHARGE SUMMARY
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 year-old male   with ongoing left knee pain secondary to instability of left knee prosthesis. The patient's pain has persisted and progressed despite conservative treatments and therapies. The patient has at this time opted for surgical intervention. PAST MEDICAL HISTORY:   Past Medical History:   Diagnosis Date    Arthritis 2007    knees    Chronic pain     knees    Diabetes ([facility] [address]     type 2    High cholesterol     HIV disease ([facility] [address]     Hypertension     Insomnia     Psychiatric disorder     bipolar disorder       PAST SURGICAL HISTORY:   Past Surgical History:   Procedure Laterality Date    HX CARPAL TUNNEL RELEASE Right     HX COLONOSCOPY  2022    no polyps-every 5 years    HX KNEE ARTHROSCOPY Left 2018    x 2    HX KNEE REPLACEMENT Right 2014    partial    HX KNEE REPLACEMENT Left 2022    Total    HX TONSILLECTOMY  1984    HX WISDOM TEETH EXTRACTION  1990    x4 teeth       ALLERGIES:   Allergies   Allergen Reactions    Pcn [Penicillins] Rash and Itching     Patient reported in childhood    Soy Shortness of Breath and Swelling     Patient reported throat swelling        CURRENT MEDICATIONS:  A list of medications prior to the time of admission include:  Prior to Admission medications    Medication Sig Start Date End Date Taking? Authorizing Provider   glipiZIDE (GLUCOTROL) 5 mg tablet Take 5 mg by mouth daily.  Indications: type 2 diabetes mellitus   Yes Provider, Historical   cariprazine (VRAYLAR) 3 mg capsule Take 3 mg by mouth daily. Indications: bipolar I disorder with most recent episode mixed   Yes Provider, Historical   eszopiclone (LUNESTA) 1 mg tablet Take 1 mg by mouth nightly. Indications: difficulty sleeping   Yes Provider, Historical   acetaminophen (TylenoL) 325 mg tablet Take 1 Tablet by mouth two (2) times daily as needed for Pain. Patient taking differently: Take 325 mg by mouth every four (4) hours as needed for Pain. 4/21/22  Yes Concetta Jaramillo PA-C   digdnovvk-dwftilgo-crascwa ala (Biktarvy) tab tablet Take 1 Tablet by mouth daily. 50/200/25 mg  Indications: HIV   Yes Provider, Historical   darunavir-cobicistat (Prezcobix) 800-150 mg-mg per tablet Take 1 Tablet by mouth daily. Indications: HIV   Yes Provider, Historical   atorvastatin (LIPITOR) 10 mg tablet Take 10 mg by mouth daily. Indications: high cholesterol   Yes Provider, Historical   amLODIPine (NORVASC) 5 mg tablet Take 5 mg by mouth daily. Indications: high blood pressure   Yes Provider, Historical   dulaglutide (Trulicity) 1.5 DL/3.3 mL sub-q pen 1.5 mg by SubCUTAneous route every seven (7) days. On Sundays  Indications: type 2 diabetes mellitus    Provider, Historical   aspirin delayed-release (Ecotrin Low Strength) 81 mg tablet Take 1 Tablet by mouth two (2) times a day. 4/21/22   Concetta Jaramillo PA-C   naloxone (Narcan) 4 mg/actuation nasal spray Use 1 spray intranasally, then discard. Repeat with new spray every 2 min as needed for opioid overdose symptoms, alternating nostrils. 4/21/22   Concetta Jaramillo PA-C       FAMILY HISTORY: History reviewed. No pertinent family history.     SOCIAL HISTORY:   Social History     Socioeconomic History    Marital status: SINGLE   Tobacco Use    Smoking status: Former     Types: Cigarettes     Quit date: 4/6/2019     Years since quitting: 3.6     Passive exposure: Never    Smokeless tobacco: Never   Vaping Use    Vaping Use: Never used   Substance and Sexual Activity    Alcohol use: Not Currently     Comment: Last drink in 2017    Drug use: Not Currently     Comment: H/O of crack cocaine use, last used in 2017    Sexual activity: Not Currently       REVIEW OF SYSTEMS: All review of systems are negative. PHYSICAL EXAMINATION: For a detailed physical exam, please refer to the patient's chart. HOSPITAL COURSE: The patient was taken to surgery the day of admission. he underwent a left revision knee replacement. Operative course was benign. Estimated blood loss was approximately 200 cc. The patient was taken to the PACU in stable condition and was later taken to the floor in stable condition. During his hospital stay, the patient progressed well with physical therapy and occupational therapy, adherent to instructions. he had been cleared by physical therapy with stair training. he was placed on Aspirin for DVT prophylaxis. his pain has been well controlled with oral pain medications. his vitals have remained stable. he has also remained hemodynamically stable. The patient has been recommended for discharge home on POD#1. DISCHARGE INSTRUCTIONS: The patient is to be discharged home with home health. he is to continue on his prior medications per the medication reconciliation form, to which we will add:  Ecotrin 81 mg; 1 tablet p.o. Twice daily X 6 weeks  Colace 100 mg po TID prn constipation  Percocet 5/325 mg; 1-2 tablets p.o. every 4 to 6 hours p.r.n. for pain  Tylenol 325 mg 1 tablet p.o. twice daily to be taken in between doses of Percocet for breakthrough pain. Narcan 4 mg/actuation; Use 1 spray intranasally, repeat with new spray every 2-3 min as needed for opioid overdose symptoms, alternating nostrils with each spray. **Medications sent to patient's outpatient pharmacy. Patient is in possession of the above prescribed medications.     The patient is to continue at home with home physical therapy 3 times a week to work on gait training, range of motion, strengthening, and weightbearing exercises as tolerated on his left lower extremity. The patient is to progress from a walker to a cane to complete total weightbearing as tolerable. The patient is to continue to keep his incision dry. The patient is to followup with Dr. Shirley Boothe in the office approximately 1-2 weeks status post for x-rays and further evaluation.     Ok Orona PA-C  12/15/2022 0487

## 2022-12-14 NOTE — ANESTHESIA PREPROCEDURE EVALUATION
Relevant Problems   No relevant active problems       Anesthetic History   No history of anesthetic complications            Review of Systems / Medical History  Patient summary reviewed, nursing notes reviewed and pertinent labs reviewed    Pulmonary  Within defined limits                 Neuro/Psych         Psychiatric history     Cardiovascular    Hypertension              Exercise tolerance: >4 METS     GI/Hepatic/Renal  Within defined limits              Endo/Other    Diabetes    Arthritis     Other Findings              Physical Exam    Airway  Mallampati: III  TM Distance: 4 - 6 cm  Neck ROM: decreased range of motion   Mouth opening: Normal     Cardiovascular  Regular rate and rhythm,  S1 and S2 normal,  no murmur, click, rub, or gallop  Rhythm: regular           Dental  No notable dental hx       Pulmonary  Breath sounds clear to auscultation               Abdominal  GI exam deferred       Other Findings            Anesthetic Plan    ASA: 2  Anesthesia type: spinal      Post-op pain plan if not by surgeon: peripheral nerve block single    Induction: Intravenous  Anesthetic plan and risks discussed with: Patient      GA vs SAB discussed. Pt prefers SAB    Risks of bleeding, infection, nerve injury, failed block, headache requiring epidural blood patch, back pain, and failed block discussed. Procedure described as elective. Pt understands and desires to proceed. .  ACB for postop pain explained. Risks explained including bleeding, infection, nerve injury, failed block. Procedure explained as elective. Pt understands and desires to proceed.

## 2022-12-14 NOTE — PERIOP NOTES
18 [facility] made aware that SBAR is ready for review. Patient assigned room #209.  Liu Bo RN will be the nurse

## 2022-12-14 NOTE — DISCHARGE INSTRUCTIONS
[facility]     Patient Discharge Instructions    Paul Zhang / [id number] : 1965    Admitted 2022 Discharged: 2022     IF YOU HAVE ANY PROBLEMS ONCE YOU ARE AT HOME CALL THE FOLLOWING NUMBERS:   Main office number: [phone]    Your follow up appointment to see either Dr. Shirley Boothe is scheduled in 1-2 weeks. If you are unsure of your appointment date call the office at [phone]. Take Home Medications     Resume your home medictions as directed  A prescription for pain medication has been given   It is important that you take the medication exactly as they are prescribed. Keep your medication in the bottles provided by the pharmacist and keep a list of the medication names, dosages, and times to be taken in your wallet. Do not take other medications without consulting your doctor. Note:  If you have already received and/or filled a prescription for one or more of the medications you've received a prescription for when leaving the hospital, you may disguard the duplicate prescription. What to do at [address] [place] [address] Ave your prehospital diet. If you have excessive nausea or vomitting call your doctor's office     Wear portable sequential compressive devices at least 18 hours per day for 2 weeks. They may be used beyond 2 weeks as needed to help control swelling. KAILEY hose should be worn during the day - may be removed for sleep. Should be worn on both legs for 2 weeks and then on the operative extremity for a total of 6 weeks. Begin In-Home Physical Therapy; 3 times a week to work on gait training, range of motion, strengthening, and weight bearing exercises as tolerable. Continue to use your walker or cane when walking. May progress from the walker to a cane to complete total bearing as tolerable. Keep incision DRY. You may need to sponge bathe to keep the incision dry.     When to Call    - Call if you have a temperature greater then 101  - Unable to keep food down  - Are unable to bear any wieght   - Need a pain medication refill, when running low not when out of medication    Information obtained by :  I understand that if any problems occur once I am at home I am to contact my physician. I understand and acknowledge receipt of the instructions indicated above.                                                                                                                                            Physician's or R.N.'s Signature                                                                  Date/Time                                                                                                                                              Patient or Representative Signature                                                          Date/Time

## 2022-12-14 NOTE — ANESTHESIA PROCEDURE NOTES
Spinal Block    Start time: 12/14/2022 1:37 PM  End time: 12/14/2022 1:44 PM  Performed by: Edman Dandy, CRNA  Authorized by: Joann Velázquez MD     Pre-procedure: Indications: primary anesthetic  Preanesthetic Checklist: patient identified, risks and benefits discussed, anesthesia consent, site marked, patient being monitored, timeout performed and fire risk safety assessment completed and verbalized    Timeout Time: 13:37 EST      Spinal Block:   Patient Position:  Seated  Prep Region:  Lumbar  Prep: chlorhexidine and patient draped      Location:  L3-4  Technique:  Single shot  Local: mepivacaine-pf (CARBOCAINE-PF) 1.5% PF injection - Other   45 mg - 12/14/2022 1:44:00 PM  Local Dose (mL):  3  Med Admin Time: 12/14/2022 1:44 PM    Needle:   Needle Type:   Alva  Needle Gauge:  25 G  Attempts:  3      Events: CSF confirmed, no blood with aspiration and no paresthesia        Assessment:  Insertion:  Uncomplicated  Patient tolerance:  Patient tolerated the procedure well with no immediate complications  Failed Attempt x2 deborah, attempt x1 Dr. Darshana Brooks

## 2022-12-14 NOTE — PROGRESS NOTES
Problem: Mobility Impaired (Adult and Pediatric)  Goal: *Acute Goals and Plan of Care (Insert Text)  Description: PT goals to be met in 1 day:  Pt will be able to perform supine<>sit SBA for transfers at home. Pt will be able to perform sit<>stand SBA for increased ability to transfer at home safely. Pt will be able to participate in gt training >100' w/ RW, WBAT, GB and CGA/SBA for improved ability in home upon d/c. Pt will be able to perform stair training step to pattern, B/U rail and CGA to obtain safe entry into home upon d/c. Pt will be educated regarding HEP per MD protocol for optimal AROM/strength outcomes. Note: []  Patient has met MD mobilization critieria for d/c home   []  Recommend HH with 24 hour adult care   [x]  Benefit from additional acute PT session to address:  transfer training, gait training, stair training    PHYSICAL THERAPY EVALUATION    Patient: Danyell Almonte (61 y.o. male)  Date: 12/14/2022  Primary Diagnosis: Status post revision of total knee replacement, left [Z96.652]  Procedure(s) (LRB):  REVISION LEFT TOTAL KNEE ARTHROPLASTY AND ALL INDICATED PROCEDURES WITH SYNEVECTOMY AND LINER EXCHANGE (SPEC POP) (Left) Day of Surgery   Precautions:   Fall, WBAT    PLOF: Independent    ASSESSMENT :  Based on the objective data described below, the patient presents with decreased mobility in regards to bed mobility, transfers, gt quality and tolerance, balance, stair negotiation and safety due to L TKA rev surgery. Decreased AROM of L knee, dec strength of L knee, pain in L knee, dec sensation of L knee also impacting pt functional mobility. Pt rating pain on numerical pain scale pre/post and during session 8.5/10. Pt ed regarding mobility safety, WB, HEP, ice application/use, elevation, environmental safety and need to use call bell for activity. Pt supine in bed upon arrival.  Pt able to perform supine<>sit w/ CGA/SBA and sit<>stand w/ CGA/SBA.   Safety vc required throughout session to reinforce safety. Pt able to participate in gt training using RW, GB, WBAT and CGA w/ antalgic gt pattern. Answered questions by pt in regards to PT and mobility. Pt left supine in bed w/ all needs within reach, B SCD reapplied and ice pack to L knee. Nurse Taurus Rodríguez aware of session and outcomes. Recommend HHPT with responsible adult care at least 24 hours upon hospital d/c. Patient will benefit from skilled intervention to address the above impairments. Patient's rehabilitation potential is considered to be Good  Factors which may influence rehabilitation potential include:   []         None noted  []         Mental ability/status  []         Medical condition  []         Home/family situation and support systems  []         Safety awareness  [x]         Pain tolerance/management  []         Other:      PLAN :  Recommendations and Planned Interventions:   [x]           Bed Mobility Training             []    Neuromuscular Re-Education  [x]           Transfer Training                   []    Orthotic/Prosthetic Training  [x]           Gait Training                          [x]    Modalities  [x]           Therapeutic Exercises           [x]    Edema Management/Control  [x]           Therapeutic Activities            [x]    Family Training/Education  [x]           Patient Education  []           Other (comment):    Frequency/Duration: Patient will be followed by physical therapy 1-2 times per day/4-7 days per week to address goals. Discharge Recommendations: Home Health  Further Equipment Recommendations for Discharge: N/A    AMPAC: 18/24    This AMPAC score should be considered in conjunction with interdisciplinary team recommendations to determine the most appropriate discharge setting. Patient's social support, diagnosis, medical stability, and prior level of function should also be taken into consideration. SUBJECTIVE:   Patient stated I am hurting.     OBJECTIVE DATA SUMMARY:     Past Medical History:   Diagnosis Date    Arthritis 2007    knees    Chronic pain 2007    knees    Diabetes ([facility] [address] 2013    type 2    High cholesterol 2013    HIV disease ([facility] [address] 2000    Hypertension 2010    Insomnia 2000    Psychiatric disorder 2005    bipolar disorder     Past Surgical History:   Procedure Laterality Date    HX CARPAL TUNNEL RELEASE Right 1998    HX COLONOSCOPY  11/23/2022    no polyps-every 5 years    HX KNEE ARTHROSCOPY Left 2018    x 2    HX KNEE REPLACEMENT Right 2014    partial    HX KNEE REPLACEMENT Left 04/20/2022    Total    HX TONSILLECTOMY  1984    HX WISDOM TEETH EXTRACTION  1990    x4 teeth     Barriers to Learning/Limitations: yes;  physical and other anesthesia  Compensate with: Visual Cues, Verbal Cues, Tactile Cues, and Kinesthetic Cues  Home Situation:  Home Situation  Home Environment: Private residence  # Steps to Enter: 3  Rails to Enter: Yes  Hand Rails : Bilateral (wide)  One/Two Story Residence: One story  Living Alone: No  Support Systems: Other Family Member(s)  Patient Expects to be Discharged to[de-identified] Home with home health  Current DME Used/Available at Home: Lorren Barer, rolling, Cane, straight  Tub or Shower Type: Tub/Shower combination  Critical Behavior:  Neurologic State: Alert;Drowsy  Orientation Level: Oriented to person;Oriented to situation;Oriented to place  Cognition: Follows commands  Safety/Judgement: Awareness of environment  Psychosocial  Patient Behaviors: Calm; Cooperative  Skin Condition/Temp: Dry;Warm  Skin Integrity: Incision (comment) (L knee)  Skin Integumentary  Skin Color: Appropriate for ethnicity  Skin Condition/Temp: Dry;Warm  Skin Integrity: Incision (comment) (L knee)  Strength:    Strength: Generally decreased, functional  Tone & Sensation:   Tone: Normal  Sensation: Impaired (L knee)  Range Of Motion:  AROM: Generally decreased, functional  PROM: Generally decreased, functional  Posture:  Functional Mobility:  Bed Mobility:  Supine to Sit: Stand-by assistance; Additional time (vc)  Sit to Supine: Contact guard assistance; Additional time (vc)  Scooting: Stand-by assistance  Transfers:  Sit to Stand: Contact guard assistance (vc)  Stand to Sit: Contact guard assistance (vc)  Balance:   Sitting: Intact  Standing: Intact; With support  Wheelchair Mobility:  Ambulation/Gait Training:  Distance (ft): 30 Feet (ft)  Assistive Device: Walker, rolling;Gait belt  Ambulation - Level of Assistance: Contact guard assistance (vc)  Gait Abnormalities: Antalgic;Decreased step clearance; Step to gait  Left Side Weight Bearing: As tolerated  Base of Support: Shift to right  Stance: Left decreased  Speed/Rhonda: Slow  Step Length: Left shortened;Right shortened  Swing Pattern: Left asymmetrical;Right asymmetrical  Interventions: Safety awareness training; Tactile cues; Verbal cues; Visual/Demos  Therapeutic Exercises:   Encouraged HEP  Pain:  Pain level pre-treatment: 8.5/10   Pain level post-treatment: 8.5/10   Pain Intervention(s) : Medication (see MAR); Rest, Ice, Repositioning  Response to intervention: Nurse notified, See doc flow    Activity Tolerance:   Fair   Please refer to the flowsheet for vital signs taken during this treatment. After treatment:   []         Patient left in no apparent distress sitting up in chair  [x]         Patient left in no apparent distress in bed  [x]         Call bell left within reach  [x]         Nursing notified  []         Caregiver present  []         Bed alarm activated  [x]         SCDs applied    COMMUNICATION/EDUCATION:   [x]         Role of Physical Therapy in the acute care setting. [x]         Fall prevention education was provided and the patient/caregiver indicated understanding. [x]         Patient/family have participated as able in goal setting and plan of care. [x]         Patient/family agree to work toward stated goals and plan of care.   []         Patient understands intent and goals of therapy, but is neutral about his/her participation. []         Patient is unable to participate in goal setting/plan of care: ongoing with therapy staff.  []         Other: Thank you for this referral.  Magen Cooney, PT   Time Calculation: 27 mins      Eval Complexity: History: HIGH Complexity :3+ comorbidities / personal factors will impact the outcome/ POC Exam:MEDIUM Complexity : 3 Standardized tests and measures addressing body structure, function, activity limitation and / or participation in recreation  Presentation: LOW Complexity : Stable, uncomplicated  Clinical Decision Making:Low Complexity    Overall Complexity:LOW     Troy Torres AM-PAC® Basic Mobility Inpatient Short Form (6-Clicks) Version 2    How much HELP from another person does the patient currently need    (If the patient hasn't done an activity recently, how much help from another person do you think he/she would need if he/she tried?)   Total (Total A or Dep)   A Lot  (Mod to Max A)   A Little (Sup or Min A)   None (Mod I to I)   Turning from your back to your side while in a flat bed without using bedrails? [] 1 [] 2 [x] 3 [] 4   2. Moving from lying on your back to sitting on the side of a flat bed without using bedrails? [] 1 [] 2 [x] 3 [] 4   3. Moving to and from a bed to a chair (including a wheelchair)? [] 1 [] 2 [x] 3 [] 4   4. Standing up from a chair using your arms (e.g., wheelchair, or bedside chair)? [] 1 [] 2 [x] 3 [] 4   5. Walking in hospital room? [] 1 [] 2 [x] 3 [] 4   6. Climbing 3-5 steps with a railing?+   [] 1 [] 2 [x] 3 [] 4   +If stair climbing cannot be assessed, skip item #6. Sum responses from items 1-5. Based on an AM-PAC score of 18/24 and their current functional mobility deficits, it is recommended that the patient have 3-5 sessions per week of Physical Therapy at d/c to increase the patient's independence.

## 2022-12-14 NOTE — PROGRESS NOTES
Progress Note        Patient: Arvind Lee MRN: [id number]  SSN: [id number]    YOB: 1965  Age: 61 y.o. Sex: male      Day of Surgery status post Procedure(s) (LRB):  REVISION LEFT TOTAL KNEE ARTHROPLASTY AND ALL INDICATED PROCEDURES WITH SYNEVECTOMY AND LINER EXCHANGE (SPEC POP) (Left)    Admit Date: 2022  Admit Diagnosis: Status post revision of total knee replacement, left [Z96.652]    Subjective:      POD#1 Doing well. No complaints. No SOB. No Chest Pain. No Nausea or Vomiting. No problems eating or voiding. No O/N events. Objective:        Temp (24hrs), Av °F (36.7 °C), Min:97.1 °F (36.2 °C), Max:98.9 °F (37.2 °C)    Body mass index is 37.03 kg/m². Patient Vitals for the past 12 hrs:   BP Temp Pulse Resp SpO2 Height Weight   22 1524 99/68 98.9 °F (37.2 °C) 83 12 100 % -- --   22 1149 121/77 97.1 °F (36.2 °C) 64 16 98 % 5' 7\" (1.702 m) 107.2 kg (236 lb 6.4 oz)     Recent Labs     22  1220   K 4.0       Physical Exam:  Vital Signs are Stable, with episodes of bradycardia, otherwise stable. No Acute Distress. Alert and Oriented. Negative Homans sign. Toes AROM Full. Neurovascular exam is normal.    Dressing is Clean, Dry, and Intact. Assessment/Plan:     Patient doing well. POD#1  Out of BED / PT / WBAT  DVT ppx: Mobilization, Ecotrin, KAILEY hose, and mechanical compression  D/c today pending approval from PT/OT and adequate analgesia management.     Continue PT/OT  Continue ROM    Discharge Plan: Home with home health    Signed By: Cristina Manriquez PA-C     12/15/2022 0917

## 2022-12-15 ENCOUNTER — HOSPITAL ENCOUNTER (INPATIENT)
Dept: LAB | Age: 57
DRG: 468 | End: 2022-12-15
Payer: MEDICARE

## 2022-12-15 VITALS
HEIGHT: 67 IN | SYSTOLIC BLOOD PRESSURE: 121 MMHG | OXYGEN SATURATION: 96 % | WEIGHT: 236.4 LBS | TEMPERATURE: 97.5 F | HEART RATE: 77 BPM | BODY MASS INDEX: 37.1 KG/M2 | DIASTOLIC BLOOD PRESSURE: 76 MMHG | RESPIRATION RATE: 17 BRPM

## 2022-12-15 LAB
ACID FAST STN SPEC: NEGATIVE
BACTERIA SPEC CULT: NORMAL
GLUCOSE BLD STRIP.AUTO-MCNC: 122 MG/DL (ref 70–110)
GLUCOSE BLD STRIP.AUTO-MCNC: 137 MG/DL (ref 70–110)
GRAM STN SPEC: NORMAL
HBV SURFACE AG SER QL: <0.1 INDEX
HBV SURFACE AG SER QL: NEGATIVE
HIV1 P24 AG SERPL QL IA: NONREACTIVE
HIV1+2 AB SERPL QL IA: REACTIVE
MYCOBACTERIUM SPEC QL CULT: NORMAL
SERVICE CMNT-IMP: NORMAL
SPECIMEN PREPARATION: NORMAL
SPECIMEN SOURCE: NORMAL

## 2022-12-15 PROCEDURE — 36415 COLL VENOUS BLD VENIPUNCTURE: CPT

## 2022-12-15 PROCEDURE — 74011250637 HC RX REV CODE- 250/637: Performed by: PHYSICIAN ASSISTANT

## 2022-12-15 PROCEDURE — 0SPD09Z REMOVAL OF LINER FROM LEFT KNEE JOINT, OPEN APPROACH: ICD-10-PCS | Performed by: ORTHOPAEDIC SURGERY

## 2022-12-15 PROCEDURE — 74011250636 HC RX REV CODE- 250/636: Performed by: PHYSICIAN ASSISTANT

## 2022-12-15 PROCEDURE — 3E0T3BZ INTRODUCTION OF ANESTHETIC AGENT INTO PERIPHERAL NERVES AND PLEXI, PERCUTANEOUS APPROACH: ICD-10-PCS | Performed by: ORTHOPAEDIC SURGERY

## 2022-12-15 PROCEDURE — 0SBD0ZZ EXCISION OF LEFT KNEE JOINT, OPEN APPROACH: ICD-10-PCS | Performed by: ORTHOPAEDIC SURGERY

## 2022-12-15 PROCEDURE — 0SPD0JZ REMOVAL OF SYNTHETIC SUBSTITUTE FROM LEFT KNEE JOINT, OPEN APPROACH: ICD-10-PCS | Performed by: ORTHOPAEDIC SURGERY

## 2022-12-15 PROCEDURE — 86706 HEP B SURFACE ANTIBODY: CPT

## 2022-12-15 PROCEDURE — 82962 GLUCOSE BLOOD TEST: CPT

## 2022-12-15 PROCEDURE — 0SRD0J9 REPLACEMENT OF LEFT KNEE JOINT WITH SYNTHETIC SUBSTITUTE, CEMENTED, OPEN APPROACH: ICD-10-PCS | Performed by: ORTHOPAEDIC SURGERY

## 2022-12-15 PROCEDURE — 97116 GAIT TRAINING THERAPY: CPT

## 2022-12-15 PROCEDURE — 0SUD09Z SUPPLEMENT LEFT KNEE JOINT WITH LINER, OPEN APPROACH: ICD-10-PCS | Performed by: ORTHOPAEDIC SURGERY

## 2022-12-15 PROCEDURE — 86803 HEPATITIS C AB TEST: CPT

## 2022-12-15 PROCEDURE — 74011000250 HC RX REV CODE- 250: Performed by: PHYSICIAN ASSISTANT

## 2022-12-15 PROCEDURE — 0LNR0ZZ RELEASE LEFT KNEE TENDON, OPEN APPROACH: ICD-10-PCS | Performed by: ORTHOPAEDIC SURGERY

## 2022-12-15 RX ADMIN — DOCUSATE SODIUM 50 MG AND SENNOSIDES 8.6 MG 1 TABLET: 8.6; 5 TABLET, FILM COATED ORAL at 09:05

## 2022-12-15 RX ADMIN — OXYCODONE 10 MG: 5 TABLET ORAL at 12:31

## 2022-12-15 RX ADMIN — FERROUS SULFATE TAB 325 MG (65 MG ELEMENTAL FE) 325 MG: 325 (65 FE) TAB at 09:05

## 2022-12-15 RX ADMIN — ATORVASTATIN CALCIUM 10 MG: 10 TABLET, FILM COATED ORAL at 09:06

## 2022-12-15 RX ADMIN — ASPIRIN 81 MG: 81 TABLET, COATED ORAL at 09:04

## 2022-12-15 RX ADMIN — OXYCODONE 10 MG: 5 TABLET ORAL at 04:20

## 2022-12-15 RX ADMIN — GLIPIZIDE 5 MG: 5 TABLET ORAL at 09:05

## 2022-12-15 RX ADMIN — VANCOMYCIN HYDROCHLORIDE 1500 MG: 10 INJECTION, POWDER, LYOPHILIZED, FOR SOLUTION INTRAVENOUS at 00:18

## 2022-12-15 RX ADMIN — ACETAMINOPHEN 1000 MG: 500 TABLET ORAL at 06:37

## 2022-12-15 RX ADMIN — SODIUM CHLORIDE, PRESERVATIVE FREE 10 ML: 5 INJECTION INTRAVENOUS at 09:07

## 2022-12-15 RX ADMIN — CEFAZOLIN 2 G: 10 INJECTION, POWDER, FOR SOLUTION INTRAVENOUS at 04:22

## 2022-12-15 NOTE — PROGRESS NOTES
Problem: Mobility Impaired (Adult and Pediatric)  Goal: *Acute Goals and Plan of Care (Insert Text)  Description: PT goals to be met in 1 day:  Pt will be able to perform supine<>sit SBA for transfers at home. Pt will be able to perform sit<>stand SBA for increased ability to transfer at home safely. Pt will be able to participate in gt training >100' w/ RW, WBAT, GB and CGA/SBA for improved ability in home upon d/c. Pt will be able to perform stair training step to pattern, B/U rail and CGA to obtain safe entry into home upon d/c. Pt will be educated regarding HEP per MD protocol for optimal AROM/strength outcomes. Outcome: Progressing Towards Goal   [x]  Patient has met MD mobilization critieria for d/c home   []  Recommend HH with 24 hour adult care   []  Benefit from additional acute PT session to address:      PHYSICAL THERAPY TREATMENT    Patient: Kacy Gamez (45 y.o. male)  Date: 12/15/2022  Diagnosis: Status post revision of total knee replacement, left [Z96.652] <principal problem not specified>  Procedure(s) (LRB):  REVISION LEFT TOTAL KNEE ARTHROPLASTY AND ALL INDICATED PROCEDURES WITH SYNEVECTOMY AND LINER EXCHANGE (SPEC POP) (Left) 1 Day Post-Op  Precautions: Fall, WBAT  PLOF: independent, ambulatory without AD, has a RW    ASSESSMENT:  Pt sitting in chair upon arrival.  No difficulty performing sit to stand. Ambulated with RW, step to gt pattern, very slow pace, no LOB or knee buckling. Negotiated 3 steps holding L hand rail with (B) hands and progressing laterally. Returned to room, no assistance needed into supine. Progression toward goals:   [x]      Improving appropriately and progressing toward goals  []      Improving slowly and progressing toward goals  []      Not making progress toward goals and plan of care will be adjusted     PLAN:  Patient continues to benefit from skilled intervention to address the above impairments.   Continue treatment per established plan of care.  Discharge Recommendations: Home Physical Therapy  Further Equipment Recommendations for Discharge:  rolling walker    AMPAC: 20/24    This AMPAC score should be considered in conjunction with interdisciplinary team recommendations to determine the most appropriate discharge setting. Patient's social support, diagnosis, medical stability, and prior level of function should also be taken into consideration. SUBJECTIVE:   Patient stated ok.     OBJECTIVE DATA SUMMARY:   Critical Behavior:  Neurologic State: Alert  Orientation Level: Oriented X4  Cognition: Follows commands  Safety/Judgement: Awareness of environment  Functional Mobility Training:  Bed Mobility:  Sit to Supine: Supervision  Scooting: Independent  Transfers:  Sit to Stand: Supervision  Stand to Sit: Supervision  Balance:  Sitting: Intact  Standing: Intact; With support   Ambulation/Gait Training:  Gait Abnormalities: Antalgic;Decreased step clearance; Step to gait  Left Side Weight Bearing: As tolerated  Speed/Rhonda: Slow  Step Length: Right shortened;Left shortened  Stairs:  Number of Stairs Trained: 3  Stairs - Level of Assistance: Supervision  Rail Use: Left         Pain:  Pain level pre-treatment: 5/10  Pain level post-treatment: 7/10   Pain Intervention(s): Medication (see MAR); Rest, Ice, Repositioning   Response to intervention: Nurse notified, See doc flow    Activity Tolerance:   fair  Please refer to the flowsheet for vital signs taken during this treatment. After treatment:   [] Patient left in no apparent distress sitting up in chair  [x] Patient left in no apparent distress in bed  [x] Call bell left within reach  [x] Nursing notified  [] Caregiver present  [] Bed alarm activated  [] SCDs applied      COMMUNICATION/EDUCATION:   [x]         Role of Physical Therapy in the acute care setting. [x]         Fall prevention education was provided and the patient/caregiver indicated understanding.   [x]         Patient/family have participated as able in working toward goals and plan of care. [x]         Patient/family agree to work toward stated goals and plan of care. []         Patient understands intent and goals of therapy, but is neutral about his/her participation. []         Patient is unable to participate in stated goals/plan of care: ongoing with therapy staff.  []         Other:        Sherri Score, PTA   Time Calculation: 23 mins    Moe Agustin AM-PAC® Basic Mobility Inpatient Short Form (6-Clicks) Version 2    How much HELP from another person does the patient currently need    (If the patient hasn't done an activity recently, how much help from another person do you think he/she would need if he/she tried?)   Total (Total A or Dep)   A Lot  (Mod to Max A)   A Little (Sup or Min A)   None (Mod I to I)   Turning from your back to your side while in a flat bed without using bedrails? [] 1 [] 2 [] 3 [x] 4   2. Moving from lying on your back to sitting on the side of a flat bed without using bedrails? [] 1 [] 2 [] 3 [x] 4   3. Moving to and from a bed to a chair (including a wheelchair)? [] 1 [] 2 [x] 3 [] 4   4. Standing up from a chair using your arms (e.g., wheelchair, or bedside chair)? [] 1 [] 2 [x] 3 [] 4   5. Walking in hospital room? [] 1 [] 2 [x] 3 [] 4   6. Climbing 3-5 steps with a railing?+   [] 1 [] 2 [x] 3 [] 4   +If stair climbing cannot be assessed, skip item #6. Sum responses from items 1-5. Based on an AM-PAC score of **/24 (or **/20 if omitting stairs) and their current functional mobility deficits, it is recommended that the patient have 5-7 sessions per week of Physical Therapy at d/c to increase the patient's independence. Currently, this patient demonstrates the potential endurance, and/or tolerance for 3 hours of therapy each day at d/c.     Based on an AM-PAC score of **/24 (**/20 if omitting stairs) and their current functional mobility deficits, it is recommended that the patient have 3-5 sessions per week of Physical Therapy at d/c to increase the patient's independence. Based on an AM-PAC score of 20/24 (or **/20 if omitting stairs) and their current functional mobility deficits, it is recommended that the patient have 2-3 sessions per week of Physical Therapy at d/c to increase the patient's independence. At this time and based on an AM-PAC score of **/24 (or **/20 if omitting stairs), no further PT is recommended upon discharge due to (i.e. patient at baseline functional statusetc). Recommend patient returns to prior setting with prior services.

## 2022-12-15 NOTE — NURSE NAVIGATOR
Louie Littlejohn Rounded on post total knee revision. Patient educated: Activity:   OOB for all meals,   Walk short distances every hour during the day and evening to promote circulation, help move better and lessen stiffness. Also helps to get the muscles stretched and strong. When elevating leg and sitting do not put anything under knee. IT is important to work on getting the leg stretched out and as straight as possible. Promoting circulation  Ankle pumps 10 times an hour at hospital & home. Take medications as prescribed by provider. Pain Control:  Pain medications side effects of constipation, nausea, dizziness, itching reviewed. Reminded patient swelling, bruising and increased pain are normal at home. To help decrease swelling after surgery it is safe to lie down and elevate legs above heart to help decrease swelling. Use pillows while keeping the surgical knee straight when elevating. Use ice, distraction, moving, & change position to help with pain. Rest between activity. Educated that medications can cause nausea and decreased appetite so eat a snack before taking medication. Narcotics cause constipation so take stool softener/mild laxative daily while on narcotics. Incentive Spirometry:    Use of incentive spirometer 10 x/hr. Diet:   Eat for healing. Drink plenty of fluids so urine is lemonade in color. Patient Safety:   Call light & belongings in reach. Call for help when want to walk or get OOB. [place] Levels verbalized understand. Given the opportunity for asking questions.

## 2022-12-15 NOTE — PROGRESS NOTES
7250 - Received patient from PACU in satisfactory condition. VSS. Assumed care of patient. Dual skin assessment completed with Slick lewis RN. No pressure areas noted. Fall risk band in place. Patient is alert and oriented x 4. Patient is calm and cooperative. Denies numbness or tingling to BLE. Pedal pulses palpable and equal bilaterally. Capillary Refill < 3 seconds. Lung sounds clear bilaterally. Respirations even and unlabored. No use of accessory muscles. Educated on incentive spirometer and demonstrated proper use. Abdomen is soft and non-tender. Patient has voided post-operatively. No bladder distention evident. No complaints of bladder discomfort. Skin is warm, dry and skin color is appropriate to race. Ace wrap dressing to LLE noted CDI. No other skin integrity issues present. IV intact to right hand and infusing without difficulty. Reports pain 8/10. Ice pack applied. Patient oriented to call bell use as well as bed use. Patient oriented to phone and how to order meals. Call bell within reach. Bed in low position. Three side rails up. [address] [facility] and updated sister on patient status. 1803 - PRN oxycodone administered for 8/10 pain to left knee. [facility] notify about labs that needed to be drawn as source patient.  Stated there is no phlebotomist at this time, nurse will need to attempt to draw or phlebotomist may be available after 10 pm.

## 2022-12-15 NOTE — OP NOTES
[address], [address] [place] [address], [phone]    REVISION LEFT TOTAL KNEE ARTHROPLASTY with synovectomy, popliteus tendon release and liner exchange    Patient: Kacy Gamez MRN: [id number]  SSN: [id number]    YOB: 1965  Age: 61 y.o. Sex: male      Date of Surgery: 12/14/2022   Preoperative Diagnosis: INSTABILITY OF ENTERNAL LEFT KNEE PROSTHESIS   Postoperative Diagnosis: INSTABILITY OF ENTERNAL LEFT KNEE PROSTHESIS   Location: [facility]  Surgeon: Rosaura Gonzalez MD  Physician Assistant: NIKKI Mcclure was medically necessary for holding of retractors for exposure and to complete the case. Assistant: Circ-1: Alma Rosa Domingo  Circ-2: Kristian Lopez RN  Physician Assistant: Chelle Barrera PA-C  Scrub Tech-1: Keota Basket  Retractor Hamm: [employer]  Surg Asst-1: Mike Stoll  Float Staff: Alise Roberson RN    Anesthesia: Spinal + Low femoral Nerve Block    Procedure: Left Revision Total Knee Arthroplasty with synovectomy, popliteus tendon release and liner exchange    Findings:  left total knee arthroplasty with instability, synovitis    Estimated Blood Loss: 200 mL    Fluids: see anesthesia record    Specimens: synovium for permanent pathology    Cultures: synovium and synovial fluid (aerobic, anaerobic, fungus, AFB)    Complications: None    Tourniquet Time:   Total Tourniquet Time Documented:  Thigh (Right) - 28 minutes  Total: Thigh (Right) - 28 minutes    Tourniquet Pressure: 280 mm Hg    Tranexamic Acid: 1000 mg/10 mL + 15 mL NS given IV  x2 doses    Exparel solution: 20 mL (13 mg/mL) + 40 mL NS    Implants:   Implant Name Type Inv.  Item Serial No.  Lot No. LRB No. Used Action   INSERT TIB SZ 5-6 [id number] LT KNEE BI [facility]UC [facility] [id number]  INSERT TIB SZ 5-6 [id number] LT KNEE BI CRUC STBL [employer]_ [id number] Left 1 Implanted Patient Condition: Stable.    ---------  INDICATIONS:   This 61y.o.-year-old male has had pain in the left knee following primary TKA 1 year ago and has become functionally disabled with respect to the activities of daily living. The pain is increased with weight-bearing. The pain and dysfunction were not releaved by conservative therapy analgesics (tylenol), bracing and use of a cane. The radiographs showed cemented total knee arthroplasty in overall good alignment. A left revision total knee replacement has been recommended. The risks and the possible complications of this surgery and anesthesia including, but not exclusive to, bleeding, infection, dislocation, fracture, blood clots, nerve and vascular injury, possible need for other procedures, and possibly death have been explained to the patient. The patient accepts these risks for the benefits of joint replacement over the failure of non-operative care to provide adequate pain relief and improve their functional disability. The patients medical problems have been addressed by their primary care physician and are deemed stable and as well controlled as possible. Inpatient hospital care was medically necessary, reasonable, and appropriate. This is a medically necessary procedure. As such, without use of a surgical assistant to retract soft tissues, protect vital neuro-vascular structures and assist in the technical aspects of the operation, this procedure would not have been possible. Therefore this assistant was medically necessary. DESCRIPTION OF PROCEDURE:    After the risks and benefits of surgery were discussed, informed consent was obtained. The patient was identified in the preoperative holding area and the operative extremity was marked. Preoperatively a low femoral nerve block was performed by anesthesia. The patient was taken to the operating room and placed in the supine position. Spinal anesthesia was performed.   IV antibiotics were given. No Houston catheter was placed. After verification of the appropriate operative site from the consent form, with confirmation of surgeon, anesthesia and nursing teams, a tourniquet was placed and the left leg was prepped and draped in sterile fashion using chlorhexidine, hydrogen peroxide and Chloraprep. A formal timeout was performed. The tourniquet was inflated and a midline incision was made in line with the previous incision over the anterior knee medial to the tibial tubercle and the dissection was taken down to Leonard's fascia. A trivector arthrotomy was performed. Moderate amount of serosanguineous fluid was present and this was sent for culture. Medial release was made and the infrapatellar fat pad was trimmed. An extensive synovectomy was performed - with some of the synovium sent for pathology and some for tissue culture. Inspection of the knee revealed the femoral component was well fixed; the patellar component was well fixed and the tibial component was well fixed. There was some laxity in flexion, worst laterally. The 9 mm BCS polyethylene liner was removed. The popliteus tendon was red and inflamed. This was released. Trial liners were placed and the 13 mm had the best fit. 20 cc of Exparel solution and 10 cc of 0.25% Marcaine were injected into the posterior soft tissues. Care was taken to aspirate prior to injecting to prevent intravascular anesthetic injection. The new 13 mm constrained polyethylene liner was placed. The remaining 40 cc of Exparel solution and an additional 20 cc of 0.25% Marcaine were injected into the deep and superficial soft tissues around the knee as well as the periosteum. The knee was copiously irrigated with betadine solution and saline. The tourniquet was released and hemostasis was achieved.   A standard layered closure was performed using #1 Knotless Stratafix for the fascia, 0 Vicryl for the subcutaneous and 3-0 Vicryl for the subcuticular layer followed by skin closure with staples. Xeroform was applied. Sterile dressings were placed. The patient was awakened and there were no complications. Final sponge and needle counts were correct x2.     Nirmala Nichole MD

## 2022-12-15 NOTE — NURSE NAVIGATOR
Discharge AVS reviewed with patient. Patient able to do teach back about medications to take for recovery along with what instructions to follow per Dr. Nicola Ellis. Patient has discharge instructions along with education recovery book. Dressing to left knee dry and intact with a small amount of old blood noted. Ace wrap removed and support hose applied. Family in room to hear discharge instructions. Patient denies pain, nausea, and trouble urinating. He knows when his follow up appointment is scheduled. Transportation to taking patient to the care in a wheelchair. Given opportunity to ask questions. Reports he already has his prescriptions at home.

## 2022-12-15 NOTE — PROGRESS NOTES
D/c plan: Home w/ [facility]. Sister to transport. CM met w/ pt at bedside. Confirmed information on the pt's face sheet. Pt lives at home w/ his sister and brother-in-law. Pt is independent in all ADLs and IDLs at baseline without an assistive device. CM confirmed pt has a RW. Discussed HH. Pt is in agreement. Discussed FOC. Pt will d/c w/ [facility]. Sister to transport and assist in his recovery. Transition of Care (OZZY) Plan:          Pt admitted for an elective surgical procedure. Left Revision Total Knee Arthroplasty with synovectomy, popliteus tendon release and liner exchange. Pt is independent. Please encourage ambulation. No transition of care needs identified at this time. Anticipate pt will be medically stable for discharge within the next 24-48 hours with physician follow up. CM available to assist as needed. OZZY Transportation:   How is patient being transported at discharge? sister      When? Once cleared by physician     Is transport scheduled? N/A      Follow-up appointment and transportation:   PCP/Specialist?  See AVS for Appointment         Who is transporting to the follow-up appointment? Sister      Is transport for follow up appointment scheduled? N/A    Communication plan (with patient/family): Who is being called? Patient  Responsible party? Patient      What number(s) is to be used? See Facesheet      What service provider is calling for [facility] services? Jorge            When are they calling? Within 24 to 48 hours. Readmission Risk? (Green/Low; Yellow/Moderate; Red/High):  [employer] here to complete [address] including selection of the Healthcare Decision Maker Relationship (ie \"Primary\")    Care Management Interventions  PCP Verified by CM: Yes (Dr. Fish Garnica)  Palliative Care Criteria Met (RRAT>21 & CHF Dx)?: No  Mode of Transport at Discharge:  Other (see comment) (Sister)  Transition of Care Consult (CM Consult): Home [facility]: No  Reason Outside [place]: Physician referred to specific agency  Discharge Durable Medical Equipment: No (CM confirmed pt has a RW. )  Physical Therapy Consult: Yes  Occupational Therapy Consult: Yes  Speech Therapy Consult: No  Support Systems: Other Family Member(s) (Sister and brother-in-law)  Confirm Follow Up Transport: Family  The Plan for Transition of Care is Related to the Following Treatment Goals [facility]  The Patient and/or Patient Representative was Provided with a Choice of Provider and Agrees with the Discharge Plan?: Yes (The pt is alert and oriented. )  Freedom of Choice List was Provided with Basic Dialogue that Supports the Patient's Individualized Plan of Care/Goals, Treatment Preferences and Shares the Quality Data Associated with the Providers?: Yes DR JOSE LEMOS [facility] )  Discharge Location  Patient Expects to be Discharged to[de-identified] Home with home health

## 2022-12-15 NOTE — ROUTINE PROCESS
Bedside and Verbal shift change report given to FALLON Lopez (oncoming nurse) by SHEREE Benz RN (offgoing nurse). Report included the following information SBAR, Kardex, ED Summary, OR Summary, Intake/Output, MAR, and Recent Results.

## 2022-12-16 LAB
HBV SURFACE AB SER QL IA: POSITIVE
HBV SURFACE AB SERPL IA-ACNC: >1000 MIU/ML
HCV AB SER IA-ACNC: <0.02 INDEX
HCV AB SERPL QL IA: NEGATIVE
HCV COMMENT,HCGAC: NORMAL
HCV RNA SERPL NAA+PROBE-LOG IU: NOT DETECTED HCV LOG 10IU/ML
HCV RNA SERPL PROBE AMP-ACNC: NOT DETECTED HCVIU/ML
HCV RNA SERPL QL NAA+PROBE: NOT DETECTED
HEP BS AB COMMENT,HBSAC: NORMAL
HIV 1+2 AB+HIV1 P24 AG SERPL QL IA: ABNORMAL
HIV12 RESULT COMMENT, HHIVC: ABNORMAL

## 2022-12-16 NOTE — PROGRESS NOTES
8123  Bedside shift change report given to [id number] [place] [address] Ivette Hallman (oncoming nurse) by Harvey Hernandez (offgoing nurse). Report included the following information SBAR, Kardex, Intake/Output, and MAR.

## 2022-12-19 LAB
HIV 1 & 2 AB SER-IMP: ABNORMAL
HIV 2 AB SERPL QL IA: NON REACTIVE
HIV1 AB SERPL QL IA: REACTIVE
HIV1 P24 AG SERPL QL IA: NONREACTIVE
HIV1+2 AB SERPL QL IA: REACTIVE

## 2022-12-29 LAB
BACTERIA SPEC CULT: NORMAL
GRAM STN SPEC: NORMAL
SERVICE CMNT-IMP: NORMAL

## 2022-12-30 LAB
BACTERIA SPEC CULT: ABNORMAL
BACTERIA SPEC CULT: ABNORMAL
SERVICE CMNT-IMP: ABNORMAL

## 2023-01-27 LAB
ACID FAST STN SPEC: NEGATIVE
ACID FAST STN SPEC: NEGATIVE
MYCOBACTERIUM SPEC QL CULT: NEGATIVE
MYCOBACTERIUM SPEC QL CULT: NEGATIVE
SPECIMEN PREPARATION: NORMAL
SPECIMEN PREPARATION: NORMAL
SPECIMEN SOURCE: NORMAL
SPECIMEN SOURCE: NORMAL

## 2023-01-28 LAB
ACID FAST STN SPEC: NEGATIVE
ACID FAST STN SPEC: NEGATIVE
MYCOBACTERIUM SPEC QL CULT: NEGATIVE
MYCOBACTERIUM SPEC QL CULT: NEGATIVE
SPECIMEN PREPARATION: NORMAL
SPECIMEN PREPARATION: NORMAL
SPECIMEN SOURCE: NORMAL
SPECIMEN SOURCE: NORMAL

## (undated) PROCEDURE — 0SRD0J9 REPLACEMENT OF LEFT KNEE JOINT WITH SYNTHETIC SUBSTITUTE, CEMENTED, OPEN APPROACH: ICD-10-PCS

## (undated) PROCEDURE — 3E0T3BZ INTRODUCTION OF ANESTHETIC AGENT INTO PERIPHERAL NERVES AND PLEXI, PERCUTANEOUS APPROACH: ICD-10-PCS

## (undated) PROCEDURE — 0SUD09Z SUPPLEMENT LEFT KNEE JOINT WITH LINER, OPEN APPROACH: ICD-10-PCS

## (undated) PROCEDURE — 0SPD09Z REMOVAL OF LINER FROM LEFT KNEE JOINT, OPEN APPROACH: ICD-10-PCS

## (undated) PROCEDURE — 0SPD0JZ REMOVAL OF SYNTHETIC SUBSTITUTE FROM LEFT KNEE JOINT, OPEN APPROACH: ICD-10-PCS

## (undated) DEVICE — ZIMMER® STERILE DISPOSABLE TOURNIQUET CUFF WITH PROTECTIVE SLEEVE AND PLC, SINGLE PORT, SINGLE BLADDER, 34 IN. (86 CM)

## (undated) DEVICE — GARMENT COMPR M FOR 13IN FT INTMIT SGL BLDR HEM FORC II

## (undated) DEVICE — INTENDED FOR TISSUE SEPARATION, AND OTHER PROCEDURES THAT REQUIRE A SHARP SURGICAL BLADE TO PUNCTURE OR CUT.: Brand: BARD-PARKER ® CARBON RIB-BACK BLADES

## (undated) DEVICE — SUTURE MCRYL + SZ 3-0 L27IN ABSRB UD PS1 L24MM 3/8 CIR REV MCP936H

## (undated) DEVICE — 3M™ IOBAN™ 2 ANTIMICROBIAL INCISE DRAPE 6650EZ: Brand: IOBAN™ 2

## (undated) DEVICE — PEEL-AWAY TOGA, X-LARGE: Brand: FLYTE

## (undated) DEVICE — HOOD, PEEL-AWAY: Brand: FLYTE

## (undated) DEVICE — BLADE ES L2.75IN ELASTOMERIC COAT DURABLE BEND UPTO 90DEG

## (undated) DEVICE — GLOVE ORANGE PI 8 1/2   MSG9085

## (undated) DEVICE — SYR 10ML CTRL LR LCK NSAF LF --

## (undated) DEVICE — SUTURE STRATAFIX SZ 1 L14IN ABSRB VLT L36MM MO-4 TAPERPOINT SXPD2B400

## (undated) DEVICE — COVER LT HNDL PLAS RIG 2 PER PK

## (undated) DEVICE — 3.0MM PRECISION NEURO (MATCH HEAD)

## (undated) DEVICE — UNDERCAST PADDING: Brand: DEROYAL

## (undated) DEVICE — STERILE POLYISOPRENE POWDER-FREE SURGICAL GLOVES WITH EMOLLIENT COATING: Brand: PROTEXIS

## (undated) DEVICE — BLADE SAW 1.19X20X90 MM FOR LG BNE

## (undated) DEVICE — Z INACTIVE USE 2720250 BIT DRL 3.2X145MM

## (undated) DEVICE — STRIP,CLOSURE,WOUND,MEDI-STRIP,1/2X4: Brand: MEDLINE

## (undated) DEVICE — SYSTEM SKIN CLSR 22CM DERMBND PRINEO

## (undated) DEVICE — BLADE SAW W13XL90MM 1.19MM PARA

## (undated) DEVICE — GOWN,SIRUS,NONRNF,SETINSLV,2XL,18/CS: Brand: MEDLINE

## (undated) DEVICE — PENCIL ES L3M ROCK SWCH S STL HEX LOK BLDE ELECTRD HOLSTER

## (undated) DEVICE — SOL INJ L R 1000ML BG --

## (undated) DEVICE — NEEDLE HYPO 22GA L1.5IN BLK S STL HUB POLYPR SHLD REG BVL

## (undated) DEVICE — HANDPIECE SET WITH HIGH FLOW TIP AND SUCTION TUBE: Brand: INTERPULSE

## (undated) DEVICE — GLOVE SURG SZ 75 L12IN FNGR THK79MIL GRN LTX FREE

## (undated) DEVICE — Z DISCONTINUED USE 2272124 DRAPE SURG XL N INVASIVE 2 LAYR DISP

## (undated) DEVICE — SUT VCRL + 0 36IN CT1 UD --

## (undated) DEVICE — SWAB CULT SGL AMIES W/O CHAR FOR THRT VAG SKIN HRT CULTSWAB

## (undated) DEVICE — SUTURE VCRL + SZ 3-0 L27IN ABSRB UD CT-2 L26MM 1/2 CIR TAPR VCP232H

## (undated) DEVICE — 3 BONE CEMENT MIXER: Brand: MIXEVAC

## (undated) DEVICE — GLOVE SURG SZ 65 L12IN FNGR THK79MIL GRN LTX FREE

## (undated) DEVICE — PACK PROCEDURE SURG TOT KNEE CUST

## (undated) DEVICE — PREP SKN CHLRAPRP APL 26ML STR --

## (undated) DEVICE — GLOVE ORANGE PI 7 1/2   MSG9075

## (undated) DEVICE — DRESSING,GAUZE,XEROFORM,CURAD,5"X9",ST: Brand: CURAD

## (undated) DEVICE — OPTIFOAM GENTLE SA, POSTOP, 4X12: Brand: MEDLINE

## (undated) DEVICE — SOL IRR NACL 0.9% 500ML POUR --

## (undated) DEVICE — SWAB CULT LIQ STUART AGR AERB MOD IN BRK SGL RAYON TIP PLAS 220099] BECTON DICKINSON MICRO]

## (undated) DEVICE — SPONGE LAPAROTOMY W18XL18IN WHITE STRUNG RADIOPAQUE STERILE

## (undated) DEVICE — SUTURE VCRL + SZ 1 L36IN ABSRB UD L36MM CT-1 1/2 CIR VCP947H

## (undated) DEVICE — STAPLER SKN FIX HD COUNT STRL -- SUB STAPLER35WA  6/CA $58.16

## (undated) DEVICE — ROCKER SWITCH PENCIL HOLSTER: Brand: VALLEYLAB

## (undated) DEVICE — Device

## (undated) DEVICE — STRYKER PERFORMANCE SERIES SAGITTAL BLADE: Brand: STRYKER PERFORMANCE SERIES

## (undated) DEVICE — SOLUTION IV 1000ML LAC RINGERS PH 6.5 INJ USP VIAFLX PLAS

## (undated) DEVICE — BANDAGE COMPR SELF ADH 5 YDX4 IN TAN STRL PREMIERPRO LF

## (undated) DEVICE — SHEET,DRAPE,70X100,STERILE: Brand: MEDLINE

## (undated) DEVICE — TOWEL,OR,DSP,ST,BLUE,STD,4/PK,20PK/CS: Brand: MEDLINE

## (undated) DEVICE — HEX-LOCKING BLADE ELECTRODE: Brand: EDGE